# Patient Record
Sex: FEMALE | Race: WHITE | NOT HISPANIC OR LATINO | Employment: PART TIME | URBAN - METROPOLITAN AREA
[De-identification: names, ages, dates, MRNs, and addresses within clinical notes are randomized per-mention and may not be internally consistent; named-entity substitution may affect disease eponyms.]

---

## 2019-08-13 ENCOUNTER — HOSPITAL ENCOUNTER (OUTPATIENT)
Dept: RADIOLOGY | Facility: HOSPITAL | Age: 69
Discharge: HOME | End: 2019-08-13
Payer: MEDICARE

## 2019-08-13 ENCOUNTER — OFFICE VISIT (OUTPATIENT)
Dept: URGENT CARE | Facility: CLINIC | Age: 69
End: 2019-08-13
Payer: MEDICARE

## 2019-08-13 VITALS
SYSTOLIC BLOOD PRESSURE: 131 MMHG | WEIGHT: 122 LBS | DIASTOLIC BLOOD PRESSURE: 60 MMHG | HEIGHT: 68 IN | TEMPERATURE: 98 F | RESPIRATION RATE: 18 BRPM | HEART RATE: 62 BPM | BODY MASS INDEX: 18.49 KG/M2 | OXYGEN SATURATION: 95 %

## 2019-08-13 DIAGNOSIS — J45.20 MILD INTERMITTENT REACTIVE AIRWAY DISEASE WITHOUT COMPLICATION: ICD-10-CM

## 2019-08-13 DIAGNOSIS — R06.2 WHEEZE: ICD-10-CM

## 2019-08-13 DIAGNOSIS — D84.9 IMMUNOCOMPROMISED (CMS/HCC): Primary | ICD-10-CM

## 2019-08-13 DIAGNOSIS — D84.9 IMMUNOCOMPROMISED (CMS/HCC): ICD-10-CM

## 2019-08-13 PROBLEM — M35.00 HISTORY OF SJOGREN'S DISEASE (CMS/HCC): Status: ACTIVE | Noted: 2019-08-13

## 2019-08-13 PROBLEM — M05.79 RHEUMATOID ARTHRITIS INVOLVING MULTIPLE SITES WITH POSITIVE RHEUMATOID FACTOR (CMS/HCC): Status: ACTIVE | Noted: 2019-08-13

## 2019-08-13 LAB
BASOPHILS # BLD AUTO: 0 10*3/UL
BASOPHILS NFR BLD AUTO: 1 % (ref 0–2)
EOSINOPHIL # BLD AUTO: 0.2 10*3/UL
EOSINOPHIL NFR BLD AUTO: 4 % (ref 0–3)
ERYTHROCYTE [DISTWIDTH] IN BLOOD BY AUTOMATED COUNT: 11.8 % (ref 11.5–14)
HCT VFR BLD AUTO: 42.6 % (ref 34–45)
HGB BLD-MCNC: 14.6 G/DL (ref 11.5–15.5)
LYMPHOCYTES # BLD AUTO: 1.6 10*3/UL
LYMPHOCYTES NFR BLD AUTO: 33 % (ref 11–47)
MCH RBC QN AUTO: 34 PG (ref 28–33)
MCHC RBC AUTO-ENTMCNC: 34.2 G/DL (ref 32–36)
MCV RBC AUTO: 99.4 FL (ref 81–97)
MONOCYTES # BLD AUTO: 0.5 10*3/UL
MONOCYTES NFR BLD AUTO: 10 % (ref 3–11)
NEUTROPHILS # BLD AUTO: 2.5 10*3/UL
NEUTROPHILS NFR BLD AUTO: 53 % (ref 41–81)
PLATELET # BLD AUTO: 256 10*3/UL (ref 140–350)
PMV BLD AUTO: 7.4 FL (ref 6.9–10.8)
RBC # BLD AUTO: 4.29 10*6/ΜL (ref 3.7–5.3)
WBC # BLD AUTO: 4.8 10*3/UL (ref 4.5–10.5)

## 2019-08-13 PROCEDURE — G0463 HOSPITAL OUTPT CLINIC VISIT: HCPCS

## 2019-08-13 PROCEDURE — 85025 COMPLETE CBC W/AUTO DIFF WBC: CPT | Performed by: NURSE PRACTITIONER

## 2019-08-13 PROCEDURE — 71046 X-RAY EXAM CHEST 2 VIEWS: CPT

## 2019-08-13 PROCEDURE — 86635 COCCIDIOIDES ANTIBODY: CPT | Performed by: NURSE PRACTITIONER

## 2019-08-13 PROCEDURE — 99203 OFFICE O/P NEW LOW 30 MIN: CPT | Mod: GF | Performed by: NURSE PRACTITIONER

## 2019-08-13 PROCEDURE — 36415 COLL VENOUS BLD VENIPUNCTURE: CPT | Performed by: NURSE PRACTITIONER

## 2019-08-13 RX ORDER — ALBUTEROL SULFATE 90 UG/1
2 INHALANT RESPIRATORY (INHALATION) EVERY 6 HOURS PRN
Qty: 1 INHALER | Refills: 0 | Status: SHIPPED | OUTPATIENT
Start: 2019-08-13 | End: 2020-08-12 | Stop reason: WASHOUT

## 2019-08-13 RX ORDER — FOLIC ACID 1 MG/1
2000 TABLET ORAL
Refills: 11 | COMMUNITY
Start: 2019-05-20 | End: 2021-08-29 | Stop reason: DRUGHIGH

## 2019-08-13 RX ORDER — HYDROXYCHLOROQUINE SULFATE 200 MG/1
TABLET, FILM COATED ORAL DAILY
COMMUNITY
End: 2021-08-29

## 2019-08-13 RX ORDER — MONTELUKAST SODIUM 10 MG/1
10 TABLET ORAL NIGHTLY
Qty: 30 TABLET | Refills: 0 | Status: SHIPPED | OUTPATIENT
Start: 2019-08-13 | End: 2019-09-12 | Stop reason: WASHOUT

## 2019-08-13 NOTE — PROGRESS NOTES
Valorie Hermosillo  1950    Subjective     CC:  Chief Complaint   Patient presents with   • Cough   • lung congestion   • Fatigue   • blood test     wants tested for valley fever        HPI:  Valorie Hermosillo is a 69 y.o. female who presents for request of testing for valley fever.  This patient just spent the last winter in [facility].  She is normally a resident of [facility].  The reason she became more acutely concerned was her dog recently was having hemoptysis and she took him into the vent.  They are doing testing for coccidiomycosis and several other things.  The dog is currently on antifungals and antibiotics and having significant improvement.  Patient tells me that she has a history of rheumatoid arthritis and Sjogren's disease, she is on immune suppressants to treat these diseases.  She also was a smoker for 25 years and quit in 2006.  She has not had any lung cancer screenings.  She is currently having a little bit of a cough but it is intermittent and is dry.  She feels like her lungs are tight she occasionally wheezes.  This seems to be worse at the end of the day.  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  She normally is fairly active and does not feel like she is deconditioned.  She denies any fevers, chills she is eating and drinking well.  She does have chronic constipation but her appetite is fine she has not had any changes there.    Allergies:  Allergies   Allergen Reactions   • Darvocet A500 [Propoxyphene N-Acetaminophen]        Medications:   Current Outpatient Medications on File Prior to Visit   Medication Sig Dispense Refill   • METHOTREXATE ORAL Take by mouth     • SULFASALAZINE ORAL Take by mouth     • hydroxychloroquine (PLAQUENIL) 200 mg tablet Take by mouth daily     • folic acid 1 mg tablet Take 2,000 mcg by mouth  11     No current facility-administered medications on file prior to visit.        Review of  "Systems:  Review of Systems  Pertinent positives and negatives as noted in HPI, 12 point review of systems otherwise negative.    The following have been reviewed and updated as appropriate in this visit  Tobacco  Allergies  Meds  Problems  Med Hx  Surg Hx  Fam Hx         Objective   EXAM:  /60   Pulse 62   Temp 36.7 °C (98 °F)   Resp 18   Ht 1.727 m (5' 8\")   Wt 55.3 kg (122 lb)   SpO2 95%   BMI 18.55 kg/m²     Physical Exam   Constitutional: She is oriented to person, place, and time. She appears well-developed and well-nourished. No distress.   HENT:   Head: Normocephalic and atraumatic.   Right Ear: Tympanic membrane, external ear and ear canal normal.   Left Ear: Tympanic membrane, external ear and ear canal normal.   Nose: Nose normal.   Mouth/Throat: Oropharynx is clear and moist. No oropharyngeal exudate.   Eyes: Pupils are equal, round, and reactive to light. Conjunctivae and EOM are normal. Right eye exhibits no discharge. Left eye exhibits no discharge. No scleral icterus.   Neck: Normal range of motion. Neck supple. No thyromegaly present.   Cardiovascular: Normal rate, regular rhythm and normal heart sounds. Exam reveals no gallop and no friction rub.   No murmur heard.  Pulmonary/Chest: Effort normal. No stridor. No respiratory distress. She has wheezes ( With forced expiration). She has no rales. She exhibits no tenderness.   Lymphadenopathy:     She has no cervical adenopathy.   Neurological: She is alert and oriented to person, place, and time. She exhibits normal muscle tone.   Skin: Skin is warm and dry. Capillary refill takes less than 2 seconds. No rash noted. She is not diaphoretic. No erythema.   Psychiatric: She has a normal mood and affect. Her behavior is normal. Judgment and thought content normal.   Nursing note and vitals reviewed.    Results for orders placed or performed in visit on 08/13/19   CBC w/auto differential Blood, Venous   Result Value Ref Range    WBC 4.8 " 4.5 - 10.5 10*3/uL    RBC 4.29 3.70 - 5.30 10*6/µL    Hemoglobin 14.6 11.5 - 15.5 g/dL    Hematocrit 42.6 34.0 - 45.0 %    MCV 99.4 (H) 81.0 - 97.0 fL    MCH 34.0 (H) 28.0 - 33.0 pg    MCHC 34.2 32.0 - 36.0 g/dL    RDW 11.8 11.5 - 14.0 %    Platelets 256 140 - 350 10*3/uL    MPV 7.4 6.9 - 10.8 fL    Neutrophils% 53 41 - 81 %    Lymphocytes% 33 11 - 47 %    Monocytes% 10 3 - 11 %    Eosinophils% 4 (H) 0 - 3 %    Basophils% 1 0 - 2 %    Neutrophils Absolute 2.50 10*3/uL    Lymphocytes Absolute 1.60 10*3/uL    Monocytes Absolute 0.50 10*3/uL    Eosinophils Absolute 0.20 10*3/uL    Basophils Absolute 0.00 10*3/uL     X-ray Chest 2 Views    Result Date: 8/13/2019  Narrative: Exam: 2 View CXR 08/13/2019 . Clinical history: Immunocompromised (CMS/[facility]) ([facility]); Wheeze; wheeze  concern for exposure to coccidiomycosis Comparison: None Findings: The lungs are clear and there are no pleural effusions. The cardiac and mediastinal contours are normal. Bones appear unremarkable. Lungs appear hyperinflated.     Impression: No acute infiltrate. Hyperinflated lungs could represent some airway trapping/asthma.    A/P:  Diagnoses and all orders for this visit:    Immunocompromised (CMS/[facility]) ([facility])  -     CBC w/auto differential Blood, Venous; Future  -     Coccidioides antibodies Blood, Venous; Future  -     X-ray chest 2 views; Future    Wheeze  -     CBC w/auto differential Blood, Venous; Future  -     Coccidioides antibodies Blood, Venous; Future  -     X-ray chest 2 views; Future    Mild intermittent reactive airway disease without complication  -     albuterol HFA (PROVENTIL HFA;VENTOLIN HFA) 90 mcg/actuation inhaler; Inhale 2 puffs every 6 (six) hours as needed for wheezing  -     montelukast (SINGULAIR) 10 mg tablet; Take 1 tablet (10 mg total) by mouth nightly        Discussed with patient given her concern and her immunocompromise state I think it is reasonable to test her for the coccidial antibodies.  We will go ahead and send  the blood test to help for that but it will take some time for that to come back we will call her once it is available and expressed this to the patient.  Advised patient that given her CBC results, with evidence of slightly elevated eosinophils and evidence of possible asthma with wheezing I think it is reasonable to go ahead and treat her asthma and allergies with an albuterol inhaler and some daily Singulair.  Given that the patient is having some allergy symptoms we will proceed with that.  Discussed use of over-the-counter antihistamines versus singular treatment.  At this time we will trial the Singulair for 1 month.  The patient will follow-up with her primary care provider in [place] in the next month or to see if the Singulair made a difference for her.  Advised patient on if her symptoms worsen or not improving she can present for reevaluation otherwise follow-up at home as needed. Patient expresses understanding and agreement with the plan of care, and had no further questions at the end of the exam today.  She will follow-up as needed.      Patient Instructions:  There are no Patient Instructions on file for this visit.    Follow Up from this visit:  Return if symptoms worsen or fail to improve.    Electronically signed by: Geraldine Marie CNP  8/13/2019  1:01 PM    A voice to text program was used to aid in medical record documentation. Sometimes words are printed not exactly as they were spoken. While efforts were made to carefully edit and correct any inaccuracies, some errors may be present. Errors should be taken within the context of the discussion.  Please contact our office if you need assistance interpreting this medical record or notice any mistakes.

## 2019-08-22 LAB
C IMMITIS AB TITR SER CF: NEGATIVE {TITER}
C IMMITIS IGG SER QL: NEGATIVE
C IMMITIS IGM SER QL: NEGATIVE

## 2019-08-22 NOTE — PROGRESS NOTES
Please call Valorie and let her know that the test for valley fever antibodies came back negative.  Please ask if she is feeling with the interventions we did for asthma/allergies.  Thank you

## 2020-12-17 ENCOUNTER — TELEPHONE (OUTPATIENT)
Dept: FAMILY MEDICINE | Facility: CLINIC | Age: 70
End: 2020-12-17

## 2020-12-17 ENCOUNTER — APPOINTMENT (OUTPATIENT)
Dept: FAMILY MEDICINE | Facility: CLINIC | Age: 70
End: 2020-12-17
Payer: MEDICARE

## 2020-12-17 ENCOUNTER — TELEPHONE - BILLABLE (OUTPATIENT)
Dept: FAMILY MEDICINE | Facility: CLINIC | Age: 70
End: 2020-12-17
Payer: MEDICARE

## 2020-12-17 DIAGNOSIS — U07.1 COVID-19: Primary | ICD-10-CM

## 2020-12-17 DIAGNOSIS — Z11.52 ENCOUNTER FOR SCREENING LABORATORY TESTING FOR COVID-19 VIRUS: ICD-10-CM

## 2020-12-17 LAB — SARS-COV-2 RDRP RESP QL NAA+PROBE: POSITIVE

## 2020-12-17 PROCEDURE — 99442 *INACTIVE DO NOT USE* HC PRO PHYS/QHP TELEPHONE EVALUATION 11-20 MIN: CPT | Performed by: STUDENT IN AN ORGANIZED HEALTH CARE EDUCATION/TRAINING PROGRAM

## 2020-12-17 PROCEDURE — 87635 SARS-COV-2 COVID-19 AMP PRB: CPT | Performed by: STUDENT IN AN ORGANIZED HEALTH CARE EDUCATION/TRAINING PROGRAM

## 2020-12-17 PROCEDURE — G0463 HOSPITAL OUTPT CLINIC VISIT: HCPCS | Performed by: STUDENT IN AN ORGANIZED HEALTH CARE EDUCATION/TRAINING PROGRAM

## 2020-12-17 PROCEDURE — C9803 HOPD COVID-19 SPEC COLLECT: HCPCS | Mod: CS,NCP | Performed by: STUDENT IN AN ORGANIZED HEALTH CARE EDUCATION/TRAINING PROGRAM

## 2020-12-17 ASSESSMENT — ENCOUNTER SYMPTOMS
DIARRHEA: 0
SHORTNESS OF BREATH: 0
CHILLS: 0
APPETITE CHANGE: 0
FEVER: 0
NAUSEA: 0
CONSTIPATION: 0
RHINORRHEA: 0
VOMITING: 0
FATIGUE: 1
DYSURIA: 0

## 2020-12-17 NOTE — TELEPHONE ENCOUNTER
In review of your medical history, you may be a candidate for administration of a monoclonal antibody that the FDA has issued and granted emergency use authorization to permit the use of an unapproved product to help to treat COVID-19 by decreasing symptoms and risk of hospitalization. Would you be interested in having a telephone visit with a physician to discuss? Yes. Is the patient scheduled?  Yes with Smiley Rubin DO      Criteria for COVID-19 Antibody Infusion Eligibility This Emergency Use Authorization (EUA) is for the use of the unapproved product Bamlanivimab or Casirivimab and Imdevimab for the treatment of mild to moderate COVID-19 in adults and pediatric patients with positive results of direct SARS-CoV-2 viral testing who are 12 years of age and older, weighing at least 40kg, and who are at high risk for progressing to severe COVID-19 symptoms and/or hospitalization.     High Risk is defined as patients who meet at least one of the following criteria:   [] Have a body mass index (BMI) greater than or equal to 35   [] Have chronic kidney disease   [] Have diabetes   [x] Have immunosuppressive disease   [] Are currently receiving immunosuppressive treatment   [x] Are 65 years of age or older   [] Are 55 years of age or older AND have   [] cardiovascular disease, OR  [] hypertension, OR -   [] chronic obstructive pulmonary disease/other chronic respiratory disease   [] Are 12-17 years of age AND have   [] BMI greater than or equal to the 85th percentile for their age and gender based on CDC growth charts  [] sickle cell disease, OR  [] congential or acquired heart disease, OR   [] neurodevelopmental disorders, for example, cerebral palsy, OR   [] a medical-related technological dependence, for example, tracheostomy, gastrostomy, or positive pressure ventilation (not related to COVID-19), OR   [] asthma, reactive airway or other chronic respiratory disease that requires daily medication for control    A  provider must meet with the patient to educate, determine eligibility, and then order therapy, if appropriate. A telephone billable visit will be scheduled with an appropriate provider within each of [facility]'s markets to review and complete the aforementioned items. The out-of-pocket cost for a telephone visit is $183. Furthermore, the unique code for infusion of the antibody medication, Bamlanivimab, has an associated cost of $929 for the nursing administration and $82.12 for the normal saline solution for a total of $1011.12.     Payers (Insurance) generally cover these costs; however, patient will be required to sign a waiver indicating any remaining balance associated with the infusion will be the patient's responsibility due to the vast variety of insurance companies, coverage plans, and deductibles. Additionally, your insurance may require prior authorization. The authorization staff will review each patient’s case independently.    Patient/Legal Guardian verbalizes understanding, denies further questions or concerns, is agreeable to the plan of care, and requests to proceed with scheduling of initial provider visit.

## 2020-12-17 NOTE — PROGRESS NOTES
Subjective   Per discussion with Smiley Kirkpatrick, Valorie, has verbally consented to be treated via a telephone based visit: Yes. A total of 15 minutes were required for this telephone based visit.   Patient Location: Home  Provider Location: Clinic  Technology used by Provider: Phone    HPI  Valorie Hermosillo is a 70 y.o. female who presents to discuss BAM after having been diagnosed with COVID today.    She started having symptoms of COVID on 12/16/2020 was diagnosed today. She is on immunosuppressants for rheumatoid arthritis.  She is taking both Plaquenil and sulfasalazine.  Her doctor is in [place].  She report that she has been feeling fatigued but otherwise not too badly.    The following have been reviewed and updated as appropriate in this visit:    Allergies   Allergen Reactions   • Darvocet A500 [Propoxyphene N-Acetaminophen]      Current Outpatient Medications   Medication Sig Dispense Refill   • BIOTIN ORAL Take by mouth     • ascorbic acid/collagen hydr (COLLAGEN PLUS VITAMIN C ORAL) Take by mouth     • SULFASALAZINE ORAL Take by mouth     • hydroxychloroquine (PLAQUENIL) 200 mg tablet Take by mouth daily     • folic acid 1 mg tablet Take 2,000 mcg by mouth  11   • METHOTREXATE ORAL Take by mouth       No current facility-administered medications for this visit.      Past Medical History:   Diagnosis Date   • RA (rheumatoid arthritis) (CMS/HCC) (Formerly [facility])      Past Surgical History:   Procedure Laterality Date   • HYSTERECTOMY       Family History   Problem Relation Age of Onset   • Cancer Father    • Cancer Sister      Social History     Occupational History   • Not on file   Tobacco Use   • Smoking status: Former Smoker   • Smokeless tobacco: Never Used   Substance and Sexual Activity   • Alcohol use: Yes     Comment: wine 1-2 glasses 3-4 nights a week   • Drug use: Never   • Sexual activity: Defer   Social History Narrative     lives here in [place] winter       Review of Systems    Constitutional: Positive for fatigue. Negative for appetite change, chills and fever.   HENT: Negative for rhinorrhea.    Respiratory: Negative for shortness of breath.    Cardiovascular: Negative for chest pain and leg swelling.   Gastrointestinal: Negative for constipation, diarrhea, nausea and vomiting.   Genitourinary: Negative for dysuria.       Objective   Physical Exam  Vitals reviewed: not performed due to telephone visit.         Assessment/Plan   Diagnoses and all orders for this visit:    COVID-19  -     Bamlanivimab Ambulatory Referral to Infusion Therapy       Valorie Hermosillo has COVID with plans to initiate bamlanivimab via EUA.     The patient is NOT primarily admitted to the hospital, requiring oxygen or requiring oxygen greater than baseline requirement due to an active COVID-19 infection.    The patient has mild-moderate COVID-19 with a positive COVID-CoV-2 test and is within 10 days of symptom onset with high risk for progression to severe COVID-19 and/or hospitalization. Date of symptom onset 3/16/2020.    The patient has the following high-risk criteria meeting requirements for the EUA: Age greater than or equal to 65.    The patient and or patient's parent(s)/caregiver(s) has been given the 'Fact Sheet for Patient and Parents/Caregivers', informed of the alternatives to receiving bamlanivimab and informed that bamlanivimab is an unapproved drug that is authorized for use under EUA (Emergency Use Authorization).     Bamlanivimab fact sheet for healthcare providers  Bamlanivimab  fact sheet for patients and parents and/or caregivers (english)  Bamlanivimab  fact sheet for patients and parents and/or caregivers (M[place]ovan)    The patient does not have any known past adverse reaction(s) or hypersensitivity to bamlanivimab.    The patient  has agreed to treatment with bamlanivimab.    The patient will be monitored closely for adverse drug events. If concern for an adverse event occurs, pharmacy will  be contacted and the event will be reported to the FDA.    She is on immunosuppressants for her rheumatoid arthritis, I asked that she call her rheumatologist in [place] to advise whether or not she should continue these medications while she has Covid as the rheumatologist here has been recommending discontinuing them until they have recovered.    Smiley Kirkpatrick, DO

## 2020-12-17 NOTE — PATIENT INSTRUCTIONS
Bamlanivimab  fact sheet for patients and parents and/or caregivers (english)    Call your rheumatologist for their recommendations about the Plaquenil and the sulfasalazine.     Fact Sheet for Patients, Parents and Caregivers   Emergency Use Authorization (EUA) of Bamlanivimab for Coronavirus Disease 2019 (COVID-19)   You are being given a medicine called bamlanivimab for the treatment of coronavirus disease 2019 (COVID- 19). This Fact Sheet contains information to help you understand the potential risks and potential benefits of taking bamlanivimab, which you may receive.   Receiving bamlanivimab may benefit certain people with COVID-19.   Read this Fact Sheet for information about bamlanivimab. Talk to your healthcare provider if you have questions. It is your choice to receive bamlanivimab or stop it at any time.   What is COVID-19?   COVID-19 is caused by a virus called a coronavirus. People can get COVID-19 through contact with another person who has the virus.   COVID-19 illnesses have ranged from very mild (including some with no reported symptoms) to severe, including illness resulting in death. While information so far suggests that most COVID-19 illness is mild, serious illness can happen and may cause some of your other medical conditions to become worse. People of all ages with severe, long-lasting (chronic) medical conditions like heart disease, lung disease, and diabetes, for example, seem to be at higher risk of being hospitalized for COVID-19.   What are the symptoms of COVID-19?   The symptoms of COVID-19 include fever, cough, and shortness of breath, which may appear 2 to 14 days after exposure. Serious illness including breathing problems can occur and may cause your other medical conditions to become worse.   What is bamlanivimab?   Bamlanivimab is an investigational medicine used for the treatment of COVID-19 in non-hospitalized adults and adolescents 12 years of age and older with mild to  moderate symptoms who weigh 88 pounds (40 kg) or more, and who are at high risk for developing severe COVID-19 symptoms or the need for hospitalization. Bamlanivimab is investigational because it is still being studied. There is limited information known about the safety or effectiveness of using bamlanivimab to treat people with COVID-19.   The FDA has authorized the emergency use of bamlanivimab for the treatment of COVID-19 under an Emergency Use Authorization (EUA). For more information on EUA, see the section “What is an Emergency Use Authorization (EUA)?” at the end of this Fact Sheet.   What should I tell my healthcare provider before I receive bamlanivimab?   Tell your healthcare provider about all of your medical conditions, including if you:   • Have any allergies   • Are pregnant or plan to become pregnant   • Are breastfeeding or plan to breastfeed   • Have any serious illnesses   • Are taking any medications (prescription, over-the-counter, vitamins, and herbal products)     How will I receive bamlanivimab?   • Bamlanivimab is given to you through a vein (intravenous or IV) for at least 1 hour.   • You will receive one dose of bamlanivimab by IV infusion.     What are the important possible side effects of bamlanivimab?   Possible side effects of bamlanivimab are:   • Allergic reactions. Allergic reactions can happen during and after infusion with bamlanivimab. Tell your healthcare provider right away if you get any of the following signs and symptoms of allergic reactions: fever,     2   chills, nausea, headache, shortness of breath, low blood pressure, wheezing, swelling of your lips, face, or throat, rash including hives, itching, muscle aches, and dizziness.     The side effects of getting any medicine by vein may include brief pain, bleeding, bruising of the skin, soreness, swelling, and possible infection at the infusion site.   These are not all the possible side effects of bamlanivimab. Not a  lot of people have been given bamlanivimab. Serious and unexpected side effects may happen. Bamlanivimab is still being studied so it is possible that all of the risks are not known at this time.   It is possible that bamlanivimab could interfere with your body's own ability to fight off a future infection of SARS-CoV-2. Similarly, bamlanivimab may reduce your body’s immune response to a vaccine for SARS-CoV-2. Specific studies have not been conducted to address these possible risks. Talk to your healthcare provider if you have any questions.   What other treatment choices are there?   Like bamlanivimab, FDA may allow for the emergency use of other medicines to treat people with COVID-19. Go to https://www.harna89rncyaahslwuzgysneso.nih.gov/ for information on the emergency use of other medicines that are not approved by FDA to treat people with COVID-19. Your healthcare provider may talk with you about clinical trials you may be eligible for.   It is your choice to be treated or not to be treated with bamlanivimab. Should you decide not to receive bamlanivimab or stop it at any time, it will not change your standard medical care.   What if I am pregnant or breastfeeding?   There is limited experience treating pregnant women or breastfeeding mothers with bamlanivimab. For a mother and unborn baby, the benefit of receiving bamlanivimab may be greater than the risk from the treatment. If you are pregnant or breastfeeding, discuss your options and specific situation with your healthcare provider.   How do I report side effects with bamlanivimab?   Tell your healthcare provider right away if you have any side effect that bothers you or does not go away.   Report side effects to FDA MedWatch at www.fda.gov/medwatch, call 1-014-FDA-5981, or contact [employer] at 4-760-FgnsdK08 ([phone]).   How can I learn more?   • Ask your healthcare provider   • Visit [email or web address]   • Visit  https://www.jpnim78lguinapxumzngipazvc.nih.gov/   • Contact your local or state public health department     What is an Emergency Use Authorization (EUA)?   The [place] FDA has made bamlanivimab available under an emergency access mechanism called an EUA. The EUA is supported by a [place] of Health and Human Service (HHS) declaration that circumstances exist to justify the emergency use of drugs and biological products during the COVID-19 pandemic.   Bamlanivimab has not undergone the same type of review as an FDA-approved or cleared product. The FDA may issue an EUA when certain criteria are met, which includes that there are no adequate, approved, and available alternatives. In addition, the FDA decision is based on the totality of scientific evidence available showing that it is reasonable to believe that the product meets certain criteria for safety, performance, and labeling and may be effective in treatment of patients during the COVID-19 pandemic. All of these criteria must be met to allow for the product to be used in the treatment of patients during the COVID-19 pandemic.

## 2020-12-18 ENCOUNTER — HOSPITAL ENCOUNTER (OUTPATIENT)
Facility: HOSPITAL | Age: 70
Discharge: HOME | End: 2020-12-18
Payer: MEDICARE

## 2020-12-18 VITALS
DIASTOLIC BLOOD PRESSURE: 60 MMHG | HEART RATE: 63 BPM | OXYGEN SATURATION: 95 % | TEMPERATURE: 98.2 F | SYSTOLIC BLOOD PRESSURE: 104 MMHG

## 2020-12-18 DIAGNOSIS — U07.1 COVID-19: Primary | ICD-10-CM

## 2020-12-18 PROCEDURE — 2560000200 HC RX 256 W OR WO HCPCS: Mod: FB | Performed by: STUDENT IN AN ORGANIZED HEALTH CARE EDUCATION/TRAINING PROGRAM

## 2020-12-18 PROCEDURE — M0239 BAMLANIVIMAB-XXXX INFUSION: HCPCS | Performed by: STUDENT IN AN ORGANIZED HEALTH CARE EDUCATION/TRAINING PROGRAM

## 2020-12-18 PROCEDURE — 2580000300 HC RX 258: Performed by: STUDENT IN AN ORGANIZED HEALTH CARE EDUCATION/TRAINING PROGRAM

## 2020-12-18 RX ADMIN — SODIUM CHLORIDE 700 MG: 9 INJECTION, SOLUTION INTRAVENOUS at 15:15

## 2021-01-14 ENCOUNTER — TELEPHONE (OUTPATIENT)
Dept: FAMILY MEDICINE | Facility: CLINIC | Age: 71
End: 2021-01-14

## 2021-01-14 NOTE — TELEPHONE ENCOUNTER
called and visited with pt. She has had this all over body rash for 3 weeks and it appeared 1 week after BAM infusion is in [place] per  would like her to get seen so she is going to go be seen in [place]

## 2021-01-14 NOTE — TELEPHONE ENCOUNTER
Patient called and had COVID and the antibody infusion.    Patient now has a rash and is wondering if the can be a reaction to the BAM infusion.    Please advise.

## 2021-03-18 DIAGNOSIS — Z23 HIGH PRIORITY FOR 2019-NCOV VACCINE: ICD-10-CM

## 2021-05-04 ENCOUNTER — CONSULT (OUTPATIENT)
Dept: PULMONOLOGY | Facility: CLINIC | Age: 71
End: 2021-05-04
Payer: MEDICARE

## 2021-05-04 VITALS — SYSTOLIC BLOOD PRESSURE: 102 MMHG | DIASTOLIC BLOOD PRESSURE: 64 MMHG

## 2021-05-04 DIAGNOSIS — K21.9 GASTROESOPHAGEAL REFLUX DISEASE, UNSPECIFIED WHETHER ESOPHAGITIS PRESENT: ICD-10-CM

## 2021-05-04 DIAGNOSIS — R05.3 CHRONIC COUGH: ICD-10-CM

## 2021-05-04 DIAGNOSIS — R91.8 PULMONARY INFILTRATE: Primary | ICD-10-CM

## 2021-05-04 DIAGNOSIS — R91.8 OPACITY OF LUNG ON IMAGING STUDY: ICD-10-CM

## 2021-05-04 PROCEDURE — G0463 HOSPITAL OUTPT CLINIC VISIT: HCPCS | Mod: PO | Performed by: INTERNAL MEDICINE

## 2021-05-04 PROCEDURE — 99204 OFFICE O/P NEW MOD 45 MIN: CPT | Performed by: INTERNAL MEDICINE

## 2021-05-04 RX ORDER — FLUTICASONE PROPIONATE 100 UG/1
1 POWDER, METERED RESPIRATORY (INHALATION) AS NEEDED
COMMUNITY
End: 2021-08-29 | Stop reason: SDUPTHER

## 2021-05-05 ENCOUNTER — TELEPHONE (OUTPATIENT)
Dept: PULMONOLOGY | Facility: CLINIC | Age: 71
End: 2021-05-05

## 2021-05-05 DIAGNOSIS — Z01.818 PRE-OPERATIVE CLEARANCE: Primary | ICD-10-CM

## 2021-05-05 NOTE — TELEPHONE ENCOUNTER
Contacted patient and went over the following information:    Procedure: Endobronchial Ultrasound/Bronchoscopy  Place: [facility]- [address] [place] [address]  Doctor to perform procedure: Dr. Andres Rios  Date: Thursday, May 20th at 7:00am  Time: Report to Main Entrance ([address] entrance) of the Hospital at  6:00am  . *Please bring your insurance card, ID card, and wear a mask.    Instructions:  *Do not eat or drink anything after midnight before your procedure, with exception of morning meds which are okay to take with sips of water only.  *Do not take any anticoagulants (such as Coumadin, Xarelto, Plavix, etc.), aspirin, or NSAIDs (Advil, Aleve, Motrin, ibuprofen, naproxen, or Naprosyn) 5 days prior to the procedure, unless otherwise discussed with your Pulmonologist.  *Don’t put on any lotion, cream, deodorant, makeup, powder, perfume, or cologne.  *Remove dentures, partials, and any jewelry, including body piercings.   *Leave valuable items at home (such as credit cards, jewelry, and your checkbook).  *You must have someone to bring you to the procedure and they must be at the hospital to drive you home after the procedure, as you are not to drive for the rest of the day.  *Please plan to be at the hospital for 2-4 hours, barring any complications. This procedure is done on an outpatient basis.   **A COVID19 test for pre-operative clearance must be done 4-7 days prior to procedure unless approved by the surgery board. This test is not needed if patient has been vaccinated for COVID-19 and it has been 7 days after patient has received their 2nd vaccination dose of Moderna or Pfizer or 7 days after receiving the Chris and Chris vaccine. (Order placed and patient states she will get this done at [facility]).    *Please contact our office at [phone] or LISSETH Haq (Pulmonary Navigator) at [phone] if you have any questions or concerns  prior to your upcoming procedure or if you need to reschedule your procedure.      Patient verbalized understanding of the above information and instructions.

## 2021-05-17 ENCOUNTER — CLINICAL SUPPORT (OUTPATIENT)
Dept: FAMILY MEDICINE | Facility: CLINIC | Age: 71
End: 2021-05-17
Payer: MEDICARE

## 2021-05-17 DIAGNOSIS — Z01.818 PRE-OPERATIVE CLEARANCE: ICD-10-CM

## 2021-05-17 PROCEDURE — U0005 INFEC AGEN DETEC AMPLI PROBE: HCPCS | Performed by: FAMILY MEDICINE

## 2021-05-17 PROCEDURE — C9803 HOPD COVID-19 SPEC COLLECT: HCPCS | Mod: CS | Performed by: FAMILY MEDICINE

## 2021-05-17 PROCEDURE — G0463 HOSPITAL OUTPT CLINIC VISIT: HCPCS | Performed by: NURSE PRACTITIONER

## 2021-05-17 NOTE — PROGRESS NOTES
Pt.presents for a pre op covid test obtained sample and sent to lab. Pt. Tolerated well A Juany MONTANEZ

## 2021-05-18 ENCOUNTER — ANESTHESIA EVENT (OUTPATIENT)
Dept: GASTROENTEROLOGY | Facility: HOSPITAL | Age: 71
End: 2021-05-18
Payer: MEDICARE

## 2021-05-18 LAB — SARS-COV-2 RNA RESP QL NAA+PROBE: NEGATIVE

## 2021-05-19 NOTE — H&P
Assessment        1. Pulmonary infiltrate    2. Opacity of lung on imaging study    3. Chronic cough    4. Gastroesophageal reflux disease, unspecified whether esophagitis present                Plan   1.  Right middle lobe infiltrate concerning for atypical infection.  Also question whether this could be from chronic silent aspiration.  Plan for bronchoscopy 5/18 for lavage of right middle lobe and EBUS sampling of pretracheal lymph node.  Discussed at length indications, limitations, risks and benefits of the procedure and all questions were answered.  She was agreeable to have this done to evaluate for atypical infection.  She does understand the possibility of a nondiagnostic procedure.     2.  Chronic cough and wheezing.  Agree with ongoing treatment for reflux.  We will tentatively schedule swallow study and pulmonary function testing to better assess.  This may be changed depending on culture results of bronchoscopy.     3.  No nodules on CT concerning for malignancy.  With smoking history warrants annual low-dose CT until she is quit for 15 years.  Briefly discussed current guidelines on lung cancer screening     4.  We will arrange follow-up 6 weeks after bronchoscopy to reassess.  Will need swallow study and PFT done prior to that appointment.           On this date of service 55 minutes of total time was spent on this encounter (from 3619-2455, [id number].  This included preappointment review of chart, counseling, documentation, discussion of testing options, and shared decision-making.        ARIANA Hermosillo is a 71 y.o. year old female who was referred for pulmonary infiltrate.  She describes a long history of chronic cough and feeling upper airway congestion and wheezing.  Symptoms happen most days without clear pattern.  Her cough and wheezing do not seem to worsen when lying supine, although will frequently awaken her at night.  The symptoms frequently occur in the morning but not consistently  worse than at other times.  She denies any dysphagia or aspiration complaints.  No significant mutations in her typical activities associated with breathing.  She does have significant reflux disease that is much improved on PPI.  This may have helped her cough some, but still persists.     During evaluation had a CT chest done showing right middle lobe infiltrate with tree-in-bud pattern.     Has rheumatoid arthritis with chronic joint pains and swelling previously treated with methotrexate.  She had to discontinue this because of stomach symptoms and is using sulfasalazine and Plaquenil currently.  Is planned to start Cimzia but needed evaluation of pulmonary filtrate to ensure that she does not have an active infection.  Also with history of Sjogren's disease with dry mucous membranes.     Prior 25-pack-year history of tobacco, quit 10-12 years ago.  No unusual exposures.     Contracted Covid December 2020 with mild illness.  She not require hospitalization or oxygenation.  Was treated with bamlanivimab                       Objective      /64         Physical Exam  Vitals reviewed.   Constitutional:       General: She is not in acute distress.     Appearance: Normal appearance. She is normal weight.   HENT:      Nose:      Comments: No sinus tenderness     Mouth/Throat:      Mouth: Mucous membranes are moist.      Pharynx: Oropharynx is clear. No posterior oropharyngeal erythema.   Cardiovascular:      Rate and Rhythm: Normal rate and regular rhythm.   Pulmonary:      Effort: Pulmonary effort is normal.      Breath sounds: No stridor. No wheezing or rhonchi.   Abdominal:      Palpations: Abdomen is soft.      Tenderness: There is no abdominal tenderness.   Musculoskeletal:      Cervical back: No tenderness.      Right lower leg: No edema.      Left lower leg: No edema.   Lymphadenopathy:      Cervical: No cervical adenopathy.   Skin:     General: Skin is warm and dry.      Capillary Refill: Capillary  refill takes less than 2 seconds.   Neurological:      Mental Status: She is alert and oriented to person, place, and time.   Psychiatric:         Behavior: Behavior normal.                     Imaging           PFT  None available           Lab             No lab exists for component: LABALBU                                    The following have been reviewed and updated as appropriate in this visit:        Review of Systems  Per HPI.  Otherwise complete 10 system review unremarkable.        Medical History        Past Medical History:   Diagnosis Date   • RA (rheumatoid arthritis) (CMS/HCC) (HCC)                 Surgical History         Past Surgical History:   Procedure Laterality Date   • HYSTERECTOMY                   Social History            Tobacco Use   • Smoking status: Former Smoker       Years: 25.00       Quit date: 2009       Years since quittin.0   • Smokeless tobacco: Never Used   Substance Use Topics   • Alcohol use: Yes       Comment: wine 1-2 glasses 3-4 nights a week   • Drug use: Never                  Family History   Problem Relation Age of Onset   • Cancer Father     • Cancer Sister                 Current Outpatient Medications:   •  fluticasone propionate (Flovent Diskus) 100 mcg/actuation diskus inhaler, Inhale 1 puff as needed Rinse mouth with water after use. Do not swallow., Disp: , Rfl:   •  BIOTIN ORAL, Take by mouth, Disp: , Rfl:   •  ascorbic acid/collagen hydr (COLLAGEN PLUS VITAMIN C ORAL), Take by mouth, Disp: , Rfl:   •  SULFASALAZINE ORAL, Take by mouth, Disp: , Rfl:   •  hydroxychloroquine (PLAQUENIL) 200 mg tablet, Take by mouth daily, Disp: , Rfl:   •  folic acid 1 mg tablet, Take 2,000 mcg by mouth, Disp: , Rfl: 11  •  METHOTREXATE ORAL, Take by mouth, Disp: , Rfl:

## 2021-05-20 ENCOUNTER — HOSPITAL ENCOUNTER (OUTPATIENT)
Facility: HOSPITAL | Age: 71
Setting detail: OUTPATIENT SURGERY
Discharge: HOME | End: 2021-05-20
Attending: INTERNAL MEDICINE | Admitting: INTERNAL MEDICINE
Payer: MEDICARE

## 2021-05-20 ENCOUNTER — ANESTHESIA (OUTPATIENT)
Dept: GASTROENTEROLOGY | Facility: HOSPITAL | Age: 71
End: 2021-05-20
Payer: MEDICARE

## 2021-05-20 VITALS
WEIGHT: 120.6 LBS | DIASTOLIC BLOOD PRESSURE: 53 MMHG | HEIGHT: 68 IN | RESPIRATION RATE: 16 BRPM | SYSTOLIC BLOOD PRESSURE: 101 MMHG | BODY MASS INDEX: 18.28 KG/M2 | TEMPERATURE: 97.7 F | OXYGEN SATURATION: 96 % | HEART RATE: 70 BPM

## 2021-05-20 DIAGNOSIS — R91.8 PULMONARY INFILTRATE: ICD-10-CM

## 2021-05-20 LAB
APPEARANCE FLD: ABNORMAL
B PARAP IS1001 DNA NPH QL NAA+NON-PROBE: NEGATIVE
B PERT.PT PRMT NPH QL NAA+NON-PROBE: NEGATIVE
BRONCHIAL EPITHELIAL CELLS PRESENT: YES
C PNEUM DNA NPH QL NAA+NON-PROBE: NEGATIVE
COLOR FLD: COLORLESS
EPITHELIAL CELLS ON REPIRATORY GRAM STAIN /LPF: NORMAL /LPF
FLUAV RNA NPH QL NAA+NON-PROBE: NEGATIVE
FLUBV RNA NPH QL NAA+NON-PROBE: NEGATIVE
FLUID SOURCE, HEMATOLOGY: ABNORMAL
FLUID TYPE, HEMATOLOGY: ABNORMAL
HADV DNA NPH QL NAA+NON-PROBE: NEGATIVE
HCOV 229E RNA NPH QL NAA+NON-PROBE: NEGATIVE
HCOV HKU1 RNA NPH QL NAA+NON-PROBE: NEGATIVE
HCOV NL63 RNA NPH QL NAA+NON-PROBE: NEGATIVE
HCOV OC43 RNA NPH QL NAA+NON-PROBE: NEGATIVE
HMPV RNA NPH QL NAA+NON-PROBE: NEGATIVE
HPIV1 RNA NPH QL NAA+NON-PROBE: NEGATIVE
HPIV2 RNA NPH QL NAA+NON-PROBE: NEGATIVE
HPIV3 RNA NPH QL NAA+NON-PROBE: NEGATIVE
HPIV4 RNA NPH QL NAA+NON-PROBE: NEGATIVE
LYMPHOCYTES # BLD MANUAL: 3 %
M PNEUMO DNA NPH QL NAA+NON-PROBE: NEGATIVE
MACROPHAGES NFR FLD MANUAL: 10 %
MAYO CULTURE AND STAIN: NORMAL
MUCUS PRESENCE IN REPIRATORY GRAM STAIN: NORMAL
NEUTROPHILS NFR FLD MANUAL: 87 %
ORGANISM PREDOMINANCE IN REPIRATORY GRAM STAIN: NORMAL
PMNS ON RESPIRATORY GRAM STAIN /LPF: NORMAL /LPF
RBC # FLD MANUAL: 276 CELLS/MM*3
REPIRATORY GRAM STAIN INTERP: NORMAL
RSV RNA NPH QL NAA+NON-PROBE: NEGATIVE
RV+EV RNA NPH QL NAA+NON-PROBE: NEGATIVE
SARS-COV-2 RNA NPH QL NAA+NON-PROBE: NEGATIVE
TOTAL CELLS COUNTED FLD: 100 CELLS
WBC # FLD MANUAL: 1026 CELLS/MM*3

## 2021-05-20 PROCEDURE — 89051 BODY FLUID CELL COUNT: CPT | Performed by: INTERNAL MEDICINE

## 2021-05-20 PROCEDURE — 87116 MYCOBACTERIA CULTURE: CPT | Performed by: INTERNAL MEDICINE

## 2021-05-20 PROCEDURE — 6360000200 HC RX 636 W HCPCS (ALT 250 FOR IP): Performed by: ANESTHESIOLOGY

## 2021-05-20 PROCEDURE — (BLANK) HC GENERAL ANESTHESIA FACILITY CHARGE EACH ADDITIONAL MIN: Performed by: INTERNAL MEDICINE

## 2021-05-20 PROCEDURE — 87205 SMEAR GRAM STAIN: CPT | Performed by: INTERNAL MEDICINE

## 2021-05-20 PROCEDURE — 6360000200 HC RX 636 W HCPCS (ALT 250 FOR IP): Mod: JW | Performed by: NURSE ANESTHETIST, CERTIFIED REGISTERED

## 2021-05-20 PROCEDURE — 87581 M.PNEUMON DNA AMP PROBE: CPT | Performed by: INTERNAL MEDICINE

## 2021-05-20 PROCEDURE — 87206 SMEAR FLUORESCENT/ACID STAI: CPT | Performed by: INTERNAL MEDICINE

## 2021-05-20 PROCEDURE — (BLANK) HC RECOVERY PHASE-1 1ST  HOUR ACUITY LEVEL 2: Performed by: INTERNAL MEDICINE

## 2021-05-20 PROCEDURE — 2500000200 HC RX 250 WO HCPCS: Performed by: NURSE ANESTHETIST, CERTIFIED REGISTERED

## 2021-05-20 PROCEDURE — 31624 DX BRONCHOSCOPE/LAVAGE: CPT | Performed by: INTERNAL MEDICINE

## 2021-05-20 PROCEDURE — (BLANK) HC GENERAL ANESTHESIA FACILITY CHARGE 1ST 15 MIN: Performed by: INTERNAL MEDICINE

## 2021-05-20 PROCEDURE — 87798 DETECT AGENT NOS DNA AMP: CPT | Performed by: INTERNAL MEDICINE

## 2021-05-20 PROCEDURE — 87075 CULTR BACTERIA EXCEPT BLOOD: CPT | Performed by: INTERNAL MEDICINE

## 2021-05-20 PROCEDURE — 31623 DX BRONCHOSCOPE/BRUSH: CPT | Mod: 51 | Performed by: INTERNAL MEDICINE

## 2021-05-20 PROCEDURE — (BLANK): Performed by: INTERNAL MEDICINE

## 2021-05-20 PROCEDURE — 87070 CULTURE OTHR SPECIMN AEROBIC: CPT | Performed by: INTERNAL MEDICINE

## 2021-05-20 PROCEDURE — 00520 ANES CLOSED CHEST PX NOS: CPT | Performed by: NURSE ANESTHETIST, CERTIFIED REGISTERED

## 2021-05-20 PROCEDURE — 2500000200 HC RX 250 WO HCPCS: Performed by: INTERNAL MEDICINE

## 2021-05-20 PROCEDURE — 88112 CYTOPATH CELL ENHANCE TECH: CPT

## 2021-05-20 PROCEDURE — (BLANK) HC RECOVERY PHASE-2 1ST 1/2 HOUR ACUITY LEVEL 1: Performed by: INTERNAL MEDICINE

## 2021-05-20 PROCEDURE — 2580000300 HC RX 258: Performed by: ANESTHESIOLOGY

## 2021-05-20 PROCEDURE — 6370000100 HC RX 637 (ALT 250 FOR IP): Performed by: ANESTHESIOLOGY

## 2021-05-20 PROCEDURE — 87496 CYTOMEG DNA AMP PROBE: CPT | Performed by: INTERNAL MEDICINE

## 2021-05-20 PROCEDURE — 87102 FUNGUS ISOLATION CULTURE: CPT | Performed by: INTERNAL MEDICINE

## 2021-05-20 RX ORDER — IPRATROPIUM BROMIDE AND ALBUTEROL SULFATE 2.5; .5 MG/3ML; MG/3ML
3 SOLUTION RESPIRATORY (INHALATION) ONCE
Status: COMPLETED | OUTPATIENT
Start: 2021-05-20 | End: 2021-05-20

## 2021-05-20 RX ORDER — FENTANYL CITRATE/PF 50 MCG/ML
PLASTIC BAG, INJECTION (ML) INTRAVENOUS AS NEEDED
Status: DISCONTINUED | OUTPATIENT
Start: 2021-05-20 | End: 2021-05-20 | Stop reason: SURG

## 2021-05-20 RX ORDER — SODIUM CHLORIDE, SODIUM LACTATE, POTASSIUM CHLORIDE, CALCIUM CHLORIDE 600; 310; 30; 20 MG/100ML; MG/100ML; MG/100ML; MG/100ML
100 INJECTION, SOLUTION INTRAVENOUS CONTINUOUS
Status: DISCONTINUED | OUTPATIENT
Start: 2021-05-20 | End: 2021-05-20 | Stop reason: HOSPADM

## 2021-05-20 RX ORDER — PROPOFOL 10 MG/ML
INJECTION, EMULSION INTRAVENOUS AS NEEDED
Status: DISCONTINUED | OUTPATIENT
Start: 2021-05-20 | End: 2021-05-20 | Stop reason: SURG

## 2021-05-20 RX ORDER — ONDANSETRON HYDROCHLORIDE 2 MG/ML
4 INJECTION, SOLUTION INTRAVENOUS ONCE
Status: COMPLETED | OUTPATIENT
Start: 2021-05-20 | End: 2021-05-20

## 2021-05-20 RX ORDER — FAMOTIDINE 10 MG/ML
20 INJECTION INTRAVENOUS ONCE
Status: COMPLETED | OUTPATIENT
Start: 2021-05-20 | End: 2021-05-20

## 2021-05-20 RX ORDER — SODIUM CHLORIDE 0.9 % (FLUSH) 0.9 %
2 SYRINGE (ML) INJECTION EVERY 8 HOURS SCHEDULED
Status: DISCONTINUED | OUTPATIENT
Start: 2021-05-20 | End: 2021-05-20 | Stop reason: HOSPADM

## 2021-05-20 RX ORDER — SODIUM CHLORIDE 0.9 G/100ML
INJECTION, SOLUTION IRRIGATION AS NEEDED
Status: DISCONTINUED | OUTPATIENT
Start: 2021-05-20 | End: 2021-05-20 | Stop reason: HOSPADM

## 2021-05-20 RX ORDER — LIDOCAINE HYDROCHLORIDE 20 MG/ML
INJECTION, SOLUTION INFILTRATION; PERINEURAL
Status: DISCONTINUED
Start: 2021-05-20 | End: 2021-05-20 | Stop reason: HOSPADM

## 2021-05-20 RX ORDER — DIPHENHYDRAMINE HYDROCHLORIDE 50 MG/ML
25 INJECTION INTRAMUSCULAR; INTRAVENOUS ONCE AS NEEDED
Status: DISCONTINUED | OUTPATIENT
Start: 2021-05-20 | End: 2021-05-20 | Stop reason: HOSPADM

## 2021-05-20 RX ORDER — MIDAZOLAM HYDROCHLORIDE 1 MG/ML
1 INJECTION INTRAMUSCULAR; INTRAVENOUS EVERY 5 MIN PRN
Status: DISCONTINUED | OUTPATIENT
Start: 2021-05-20 | End: 2021-05-20 | Stop reason: SDUPTHER

## 2021-05-20 RX ORDER — FENTANYL CITRATE/PF 50 MCG/ML
50 PLASTIC BAG, INJECTION (ML) INTRAVENOUS EVERY 5 MIN PRN
Status: DISCONTINUED | OUTPATIENT
Start: 2021-05-20 | End: 2021-05-20 | Stop reason: HOSPADM

## 2021-05-20 RX ORDER — LIDOCAINE HYDROCHLORIDE 20 MG/ML
INJECTION, SOLUTION EPIDURAL; INFILTRATION; INTRACAUDAL; PERINEURAL AS NEEDED
Status: DISCONTINUED | OUTPATIENT
Start: 2021-05-20 | End: 2021-05-20 | Stop reason: SURG

## 2021-05-20 RX ORDER — SODIUM CITRATE AND CITRIC ACID MONOHYDRATE 334; 500 MG/5ML; MG/5ML
30 SOLUTION ORAL ONCE
Status: COMPLETED | OUTPATIENT
Start: 2021-05-20 | End: 2021-05-20

## 2021-05-20 RX ORDER — SODIUM CHLORIDE 0.9 % (FLUSH) 0.9 %
2 SYRINGE (ML) INJECTION AS NEEDED
Status: DISCONTINUED | OUTPATIENT
Start: 2021-05-20 | End: 2021-05-20 | Stop reason: HOSPADM

## 2021-05-20 RX ORDER — DEXAMETHASONE SODIUM PHOSPHATE 4 MG/ML
4 INJECTION, SOLUTION INTRA-ARTICULAR; INTRALESIONAL; INTRAMUSCULAR; INTRAVENOUS; SOFT TISSUE ONCE
Status: COMPLETED | OUTPATIENT
Start: 2021-05-20 | End: 2021-05-20

## 2021-05-20 RX ORDER — LIDOCAINE 50 MG/G
OINTMENT TOPICAL
Status: DISCONTINUED
Start: 2021-05-20 | End: 2021-05-20 | Stop reason: HOSPADM

## 2021-05-20 RX ORDER — MIDAZOLAM HYDROCHLORIDE 1 MG/ML
1 INJECTION INTRAMUSCULAR; INTRAVENOUS EVERY 5 MIN PRN
Status: DISCONTINUED | OUTPATIENT
Start: 2021-05-20 | End: 2021-05-20 | Stop reason: HOSPADM

## 2021-05-20 RX ORDER — LABETALOL HCL 20 MG/4 ML
5 SYRINGE (ML) INTRAVENOUS EVERY 5 MIN PRN
Status: DISCONTINUED | OUTPATIENT
Start: 2021-05-20 | End: 2021-05-20 | Stop reason: HOSPADM

## 2021-05-20 RX ORDER — ONDANSETRON HYDROCHLORIDE 2 MG/ML
4 INJECTION, SOLUTION INTRAVENOUS ONCE AS NEEDED
Status: DISCONTINUED | OUTPATIENT
Start: 2021-05-20 | End: 2021-05-20 | Stop reason: HOSPADM

## 2021-05-20 RX ORDER — DROPERIDOL 2.5 MG/ML
0.62 INJECTION, SOLUTION INTRAMUSCULAR; INTRAVENOUS ONCE AS NEEDED
Status: DISCONTINUED | OUTPATIENT
Start: 2021-05-20 | End: 2021-05-20 | Stop reason: HOSPADM

## 2021-05-20 RX ADMIN — IPRATROPIUM BROMIDE AND ALBUTEROL SULFATE 3 ML: .5; 3 SOLUTION RESPIRATORY (INHALATION) at 07:05

## 2021-05-20 RX ADMIN — FENTANYL CITRATE 50 MCG: 50 INJECTION, SOLUTION INTRAMUSCULAR; INTRAVENOUS at 07:24

## 2021-05-20 RX ADMIN — FENTANYL CITRATE 50 MCG: 50 INJECTION, SOLUTION INTRAMUSCULAR; INTRAVENOUS at 07:30

## 2021-05-20 RX ADMIN — PROPOFOL 50 MG: 10 INJECTION, EMULSION INTRAVENOUS at 07:27

## 2021-05-20 RX ADMIN — PROPOFOL 150 MG: 10 INJECTION, EMULSION INTRAVENOUS at 07:24

## 2021-05-20 RX ADMIN — LIDOCAINE HYDROCHLORIDE 50 MG: 20 INJECTION, SOLUTION EPIDURAL; INFILTRATION; INTRACAUDAL; PERINEURAL at 07:24

## 2021-05-20 RX ADMIN — PROPOFOL 50 MG: 10 INJECTION, EMULSION INTRAVENOUS at 07:32

## 2021-05-20 RX ADMIN — SODIUM CHLORIDE, POTASSIUM CHLORIDE, SODIUM LACTATE AND CALCIUM CHLORIDE 100 ML/HR: 600; 310; 30; 20 INJECTION, SOLUTION INTRAVENOUS at 06:56

## 2021-05-20 RX ADMIN — DEXMEDETOMIDINE HYDROCHLORIDE 4 MCG: 4 INJECTION, SOLUTION INTRAVENOUS at 07:31

## 2021-05-20 RX ADMIN — ONDANSETRON 4 MG: 2 INJECTION INTRAMUSCULAR; INTRAVENOUS at 07:07

## 2021-05-20 RX ADMIN — SODIUM CITRATE AND CITRIC ACID MONOHYDRATE 30 ML: 500; 334 SOLUTION ORAL at 07:08

## 2021-05-20 RX ADMIN — FAMOTIDINE 20 MG: 10 INJECTION INTRAVENOUS at 07:08

## 2021-05-20 RX ADMIN — DEXAMETHASONE SODIUM PHOSPHATE 4 MG: 4 INJECTION, SOLUTION INTRAMUSCULAR; INTRAVENOUS at 07:07

## 2021-05-20 ASSESSMENT — ENCOUNTER SYMPTOMS: EXERCISE TOLERANCE: GOOD (>7 METS)

## 2021-05-20 NOTE — PROCEDURES
"Bronchoscopy    Date/Time: 5/20/2021 5:11 PM  Performed by: Andres Rios MD  Authorized by: Andres Rios MD   Consent: Written consent obtained.  Risks and benefits: risks, benefits and alternatives were discussed  Consent given by: patient  Patient understanding: patient states understanding of the procedure being performed  Patient consent: the patient's understanding of the procedure matches consent given  Procedure consent: procedure consent matches procedure scheduled  Relevant documents: relevant documents present and verified  Imaging studies: imaging studies available  Patient identity confirmed: verbally with patient, provided demographic data, arm band and hospital-assigned identification number  Time out: Immediately prior to procedure a \"time out\" was called to verify the correct patient, procedure, equipment, support staff and site/side marked as required.    Sedation:  Patient sedated: yes  Sedatives: see MAR for details  Analgesia: see MAR for details  Vitals: Vital signs were monitored during sedation.    Patient tolerance: patient tolerated the procedure well with no immediate complications  Comments: After patient was sedated and LMA placed per anesthesia, Olympus therapeutic video bronchoscope was inserted. Larynx was mildly inflamed without discrete lesions. Vocal cords were edematous. Bronchoscope was easily passed through the vocal cords into the trachea. Trachea with scattered inflammation, although no focal mucosal lesions seen. Main vangie was sharp and mobile. Bronchial tree was visualized bilaterally to the subsegmental level. All airways were patent without endobronchial mass lesions. Throughout all airways, mucosa was inflamed and irregular with scattered areas of thick tenacious yellowish sputum. A BAL was performed in the right middle lobe medial subsegment with instillation of 60 mL saline and return of 30 mL cloudy yellowish fluid. A BAL was obtained from the right upper " lobe anterior subsegment with instillation of 60 mL saline and return of 30 mL frothy clear fluid. A protected specimen microbiology brush was obtained in the right middle lobe medial subsegment. All samples to be sent for cultures.    Patient tolerated procedure well without apparent complication. She was transferred to PACU in stable condition.    Procedure time 9minutes, from 7:30 AM-7:39 AM

## 2021-05-20 NOTE — ANESTHESIA PREPROCEDURE EVALUATION
"Pre-Procedure Assessment    Patient: Valorie Hermosillo, female, 71 y.o.    Ht Readings from Last 1 Encounters:   19 1.727 m (5' 8\")     Wt Readings from Last 1 Encounters:   19 55.3 kg (122 lb)       Last Vitals  BP      Temp      Pulse     Resp      SpO2      Pain Score         Problem list reviewed and Medical history reviewed           Airway   Mallampati: II      Dental      Pulmonary    (+) decreased breath sounds, wheezes,     ROS comment:    Prior 25-pack-year history of tobacco, quit 10-12 years ago.  No unusual exposures.     Contracted Covid 2020 with mild illness.  She not require hospitalization or oxygenation.  Was treated with bamlanivimab        Cardiovascular   Exercise tolerance: good (>7 METS)    Rhythm: regular  Rate: normal    Mental Status/Neuro/Psych    Pt is alert.      (+) arthritis (rheumatoid arthritis),     Comments: Sjogren's disease     GI/Hepatic/Renal      Endo/Other    Abdominal           Social History     Tobacco Use   • Smoking status: Former Smoker     Years: 25.00     Quit date: 2009     Years since quittin.0   • Smokeless tobacco: Never Used   Substance Use Topics   • Alcohol use: Yes     Comment: wine 1-2 glasses 3-4 nights a week      Hematology   WBC   Date Value Ref Range Status   2019 4.8 4.5 - 10.5 10*3/uL Final     RBC   Date Value Ref Range Status   2019 4.29 3.70 - 5.30 10*6/µL Final     MCV   Date Value Ref Range Status   2019 99.4 (H) 81.0 - 97.0 fL Final     Hemoglobin   Date Value Ref Range Status   2019 14.6 11.5 - 15.5 g/dL Final     Hematocrit   Date Value Ref Range Status   2019 42.6 34.0 - 45.0 % Final     Platelets   Date Value Ref Range Status   2019 256 140 - 350 10*3/uL Final      Coagulation No results found for: PT, APTT, INR   General Chemistry No results found for: POCGLU, CALCIUM, BUN, CREATININE, GLUCOSE, NA, K, MG, CO2, CL, DIGOXIN  Anesthesia Plan    ASA 3   NPO status reviewed: > 8 " hours    General         Induction: intravenous       Additional Comments: Sjogren's disease .... please lube eyes          Anesthetic plan and risks discussed with patient.

## 2021-05-20 NOTE — ANESTHESIA POSTPROCEDURE EVALUATION
Patient: Valorie Hermosillo    Procedure Summary     Date: 05/20/21 Room / Location: [facility] ENDO 03 / [facility] Endoscopy    Anesthesia Start: 0721 Anesthesia Stop: 0753    Procedure: BRONCHOSCOPY with bronchoaveolar lavage (N/A Bronchus) Diagnosis:       Pulmonary infiltrate      (infiltrate)    Providers: Andres Rios MD Responsible Provider: Jordan Irene MD    Anesthesia Type: general ASA Status: 3          Anesthesia Type: general    Last vitals  Vitals Value Taken Time   /57 05/20/21 0820   Temp 36.5 °C (97.7 °F) 05/20/21 0820   Pulse 71 05/20/21 0820   Resp 16 05/20/21 0820   SpO2 95 % 05/20/21 0820   0-10 Pain Score 0 - No pain 05/20/21 0820       Anesthesia Post Evaluation    Patient location during evaluation: PACU  Patient participation: complete - patient participated  Level of consciousness: awake and alert  Pain management: adequate  Airway patency: patent  Anesthetic complications: no  Cardiovascular status: acceptable  Respiratory status: acceptable  Hydration status: acceptable  May dismiss recovered patient based on consultation with the appropriate physicians and/or meeting appropriate discharge criteria      Cosmetic?  This procedure is not cosmetic.

## 2021-05-20 NOTE — ANESTHESIA PROCEDURE NOTES
Airway  Urgency: elective    Airway not difficult    General Information and Staff    Patient location during procedure: OR  Anesthesiologist: Jordan Irene MD  CRNA: Brionna Calhoun CRNA  Performed: CRNA     Indications and Patient Condition  Indications for airway management: airway protection and anesthesia  Spontaneous Ventilation: absent  Sedation level: deep  Preoxygenated: yes  Patient position: sniffing  MILS maintained throughout  Mask difficulty assessment: 0 - not attempted    Final Airway Details  Final airway type: supraglottic airway      Successful airway: unique  Size 4  SGA cuff pressure (cm H2O): Minnimal Occlusive Pressure.  Placement verified by: chest auscultation, capnometry and palpation of cuff   Number of attempts at approach: 1

## 2021-05-20 NOTE — H&P
Patient seen and examined today and the office visit note from 5/4/21 that serves as the H&P for todays's procedure reviewed. Lungs clear with good air movement.  There have been no changes, and informed consent was obtained.  We had discussed the possibility of attempt to biopsy the visible 4R lymph node.  However, the yield for any results that would change the management, and after reviewing the CT I do not feel that it is indicated.  She understands and agrees.

## 2021-05-20 NOTE — DISCHARGE INSTRUCTIONS
Post-bronchoscopy instructions  After bronchoscopy, you must not drive an automobile or operate heavy machinery, because of the lingering effects of sedation. A family member or friend must be available to drive or accompany you home.      Once at home, you may have a mild sore throat, hoarseness, cough, or muscle aches. This is normal. However, you should call pulmonary clinic ([phone]) or present to the emergency department immediately if you have acute chest pain, progressive shortness of breath, or if you cough up more than a few tablespoons of blood.    Low-grade fever (temperature up to 100.5) can occur after bronchoscopy, but usually for only for 24-36 hours. If fevers persist beyond a few days or exceed 101?F, you should contact  pulmonary clinic ([phone]).  Acetaminophen (Tylenol) or ibuprofen (Advil / Motrin) can be used for symptomatic relief unless these would be a problem due to other medical conditions, such as liver or kidney disease.       Dr. Rios will contact you within 1 week to discuss results and arrange any additional evaluation and follow-up visits.

## 2021-05-21 LAB — MAYO CULTURE AND STAIN: NORMAL

## 2021-05-22 LAB
BACTERIA ISLT CULT: NORMAL
CMV DNA SPEC QL NAA+PROBE: NEGATIVE
P JIROVECII DNA L RESP QL NAA+NON-PROBE: NEGATIVE
RHODAMINE-AURAMINE STN SPEC: NORMAL
SPECIMEN SOURCE: NORMAL
SPECIMEN SOURCE: NORMAL

## 2021-05-23 ENCOUNTER — DOCUMENTATION (OUTPATIENT)
Dept: PULMONOLOGY | Facility: CLINIC | Age: 71
End: 2021-05-23

## 2021-05-23 LAB — RHODAMINE-AURAMINE STN SPEC: NORMAL

## 2021-05-23 NOTE — PROGRESS NOTES
Recent bronchoscopy with inflamed, irregular mucosa throughout with thick tenacious sputum.  Protected microbiology brush specimen showing many colonies of microaerophilic Streptococcus species.  Will treat with penicillin V 500 mg twice a day x10 days.  Will follow up in clinic week of June 14.  Can cancel upcoming swallow study, but will likely reschedule at some point.  Discussed with patient and all questions answered.  She will contact the clinic with any further concerns in the interim

## 2021-05-24 LAB
BACTERIA ISLT CULT: ABNORMAL
BACTERIA ISLT CULT: ABNORMAL
BACTERIA ISLT CULT: NORMAL
GRAM STN SPEC: ABNORMAL

## 2021-06-07 LAB
BACTERIA ISLT CULT: NORMAL
KINYOUN IH STN SPEC: NORMAL

## 2021-06-14 ENCOUNTER — CLINICAL SUPPORT (OUTPATIENT)
Dept: FAMILY MEDICINE | Facility: CLINIC | Age: 71
End: 2021-06-14
Payer: MEDICARE

## 2021-06-14 ENCOUNTER — OFFICE VISIT (OUTPATIENT)
Dept: PULMONOLOGY | Facility: CLINIC | Age: 71
End: 2021-06-14
Payer: MEDICARE

## 2021-06-14 VITALS
DIASTOLIC BLOOD PRESSURE: 74 MMHG | SYSTOLIC BLOOD PRESSURE: 102 MMHG | BODY MASS INDEX: 18.79 KG/M2 | OXYGEN SATURATION: 95 % | HEIGHT: 68 IN | RESPIRATION RATE: 16 BRPM | HEART RATE: 77 BPM | WEIGHT: 124 LBS

## 2021-06-14 DIAGNOSIS — R91.8 PULMONARY INFILTRATE: ICD-10-CM

## 2021-06-14 DIAGNOSIS — R05.3 CHRONIC COUGH: Primary | ICD-10-CM

## 2021-06-14 DIAGNOSIS — Z23 ENCOUNTER FOR VACCINATION: Primary | ICD-10-CM

## 2021-06-14 DIAGNOSIS — K21.9 GASTROESOPHAGEAL REFLUX DISEASE, UNSPECIFIED WHETHER ESOPHAGITIS PRESENT: ICD-10-CM

## 2021-06-14 PROCEDURE — G0463 HOSPITAL OUTPT CLINIC VISIT: HCPCS | Mod: PO | Performed by: INTERNAL MEDICINE

## 2021-06-14 PROCEDURE — 99214 OFFICE O/P EST MOD 30 MIN: CPT | Performed by: INTERNAL MEDICINE

## 2021-06-14 PROCEDURE — 0001A PFIZER-BIONTECH SARS-COV-2 VACCINE: CPT | Mod: PO,NC | Performed by: PHYSICIAN ASSISTANT

## 2021-06-14 RX ORDER — MONTELUKAST SODIUM 10 MG/1
10 TABLET ORAL NIGHTLY
Qty: 30 TABLET | Refills: 11 | Status: SHIPPED | OUTPATIENT
Start: 2021-06-14 | End: 2021-08-29

## 2021-06-14 ASSESSMENT — PAIN SCALES - GENERAL: PAINLEVEL: 7

## 2021-06-14 NOTE — PROGRESS NOTES
Assessment      1. Gastroesophageal reflux disease, unspecified whether esophagitis present    2. Chronic cough    3. Pulmonary infiltrate            Plan   1.  Right middle lobe infiltrate.  Bronchoscopy was reassuring, microaerophilic streptococcus seen likely incidental finding.  Symptoms untreated after treatment.  We will repeat CT to reassess.    2.  Chronic cough and wheezing.  Agree with ongoing treatment for reflux.  Will have her schedule swallow study as previously planned.  Also start montelukast daily.  Reviewed indications and reinforced the importance of for the next taking daily.  Recommended that she take albuterol 2 puffs every morning this week along with as needed dosing cough is more significant.  After 1 week, changed to as needed only.  She can stop Flovent dosing.    3.  Patient is interested in COVID-19 vaccine.  First to be given today and she will arrange her low up injection to be done in [place].    4.  Okay to start Cimzia from a pulmonary standpoint    5.  Telehealth follow-up in 2-3 months to reassess.       On this date of service 34 minutes of total time was spent on this encounter (from 1349-585 and 0989-8511).  This included preappointment review of chart, counseling, documentation, discussion of testing options, and shared decision-making.      HPI  Valorie Hermosillo is a 71 y.o. year old female who returns chronic cough.  Since initial consult May 2021 underwent bronchoscopy to evaluate right middle lobe infiltrate with tree-in-bud pattern.  Protected brush specimen found microaerophilic Streptococcus that we treated with penicillin x10 days (starting 5/23.    Unfortunately her cough is unchanged.  Although predominant in the morning, remains sporadic throughout the day and does worsen when lying supine.  Reflux and remains well treated on PPI.    No fevers, chills or night sweats.  No new chest pain or pleuritic symptoms.    Uses Flovent sporadically with rare dosing of  "albuterol.    Has travel to [place], cocci antibodies negative.    Primary complaint of methotrexate and chronic pains today    Prior 25-pack-year history of tobacco, quit 10-12 years ago.  No unusual exposures.    Contracted Covid December 2020 with mild illness.  She not require hospitalization or oxygenation.  Was treated with bamlanivimab                Objective     /74   Pulse 77   Resp 16   Ht 1.727 m (5' 8\")   Wt 56.2 kg (124 lb)   SpO2 95%   BMI 18.85 kg/m²       Physical Exam  Vitals reviewed.   Constitutional:       General: She is not in acute distress.     Appearance: She is normal weight.   HENT:      Nose:      Comments: No sinus tenderness  Cardiovascular:      Rate and Rhythm: Normal rate and regular rhythm.   Pulmonary:      Effort: Pulmonary effort is normal.      Breath sounds: No stridor. No wheezing or rhonchi.   Musculoskeletal:      Cervical back: Neck supple.      Right lower leg: No edema.      Left lower leg: No edema.   Lymphadenopathy:      Cervical: No cervical adenopathy.   Skin:     General: Skin is warm and dry.   Neurological:      Mental Status: She is alert and oriented to person, place, and time.   Psychiatric:         Behavior: Behavior normal.                  PFT  Ordered, not done        Lab            No lab exists for component: LABALBU                              The following have been reviewed and updated as appropriate in this visit:      Review of Systems  Per HPI.  Otherwise complete 10 system review unremarkable.      Past Medical History:   Diagnosis Date   • RA (rheumatoid arthritis) (CMS/HCC) ([facility])          Past Surgical History:   Procedure Laterality Date   • BRONCHOSCOPY N/A 5/20/2021    Procedure: BRONCHOSCOPY with bronchoaveolar lavage;  Surgeon: Andres Rios MD;  Location: [facility] Endoscopy;  Service: Endoscopy;  Laterality: N/A;   • HYSTERECTOMY            Social History     Tobacco Use   • Smoking status: Former Smoker     Years: 25.00     " Quit date: 2009     Years since quittin.1   • Smokeless tobacco: Never Used   Substance Use Topics   • Alcohol use: Yes     Comment: wine 1-2 glasses 3-4 nights a week   • Drug use: Never          Family History   Problem Relation Age of Onset   • Cancer Father    • Cancer Sister             Current Outpatient Medications:   •  fluticasone propionate (Flovent Diskus) 100 mcg/actuation diskus inhaler, Inhale 1 puff as needed Rinse mouth with water after use. Do not swallow., Disp: , Rfl:   •  ascorbic acid/collagen hydr (COLLAGEN PLUS VITAMIN C ORAL), Take by mouth, Disp: , Rfl:   •  SULFASALAZINE ORAL, Take by mouth, Disp: , Rfl:   •  hydroxychloroquine (PLAQUENIL) 200 mg tablet, Take by mouth daily, Disp: , Rfl:   •  montelukast (SINGULAIR) 10 mg tablet, Take 1 tablet (10 mg total) by mouth nightly, Disp: 30 tablet, Rfl: 11  •  BIOTIN ORAL, Take by mouth, Disp: , Rfl:   •  folic acid 1 mg tablet, Take 2,000 mcg by mouth, Disp: , Rfl: 11  •  METHOTREXATE ORAL, Take by mouth, Disp: , Rfl:               A voice recognition program was used to aid in documentation of this record.  Sometimes words are not printed exactly as they were spoken.  While efforts were made to carefully edit and correct any inaccuracies, some errors may be present; please take these into context.  Please contact the provider if areas are identified.

## 2021-06-21 ENCOUNTER — HOSPITAL ENCOUNTER (OUTPATIENT)
Dept: FLUOROSCOPY | Facility: HOSPITAL | Age: 71
Discharge: HOME | End: 2021-06-21
Payer: MEDICARE

## 2021-06-21 DIAGNOSIS — K21.9 GASTROESOPHAGEAL REFLUX DISEASE, UNSPECIFIED WHETHER ESOPHAGITIS PRESENT: ICD-10-CM

## 2021-06-21 PROCEDURE — 74220 X-RAY XM ESOPHAGUS 1CNTRST: CPT

## 2021-06-21 PROCEDURE — 2590000100 HC RX 259: Performed by: RADIOLOGY

## 2021-06-21 RX ADMIN — ANTACID/ANTIFLATULENT 1 PACKET: 380; 550; 10; 10 GRANULE, EFFERVESCENT ORAL at 08:07

## 2021-06-22 ENCOUNTER — HOSPITAL ENCOUNTER (OUTPATIENT)
Dept: CT IMAGING | Facility: HOSPITAL | Age: 71
Discharge: HOME | End: 2021-06-22
Payer: MEDICARE

## 2021-06-22 DIAGNOSIS — R05.3 CHRONIC COUGH: ICD-10-CM

## 2021-06-22 DIAGNOSIS — R91.8 PULMONARY INFILTRATE: ICD-10-CM

## 2021-06-22 PROCEDURE — 71250 CT THORAX DX C-: CPT

## 2021-06-23 ENCOUNTER — TELEPHONE (OUTPATIENT)
Dept: PULMONOLOGY | Facility: CLINIC | Age: 71
End: 2021-06-23

## 2021-06-23 NOTE — TELEPHONE ENCOUNTER
Pt calls for results of CT scan. States her Rheumatologist is awaiting these results as well for medication management. Please advise.

## 2021-06-28 LAB
BACTERIA ISLT CULT: NORMAL
BACTERIA ISLT CULT: NORMAL
PAS STN SPEC: NORMAL
PAS STN SPEC: NORMAL

## 2021-06-29 NOTE — TELEPHONE ENCOUNTER
Spoke with patient and reviewed results of cultures and CT chest.  AFB cultures show no growth to date.  CT is stable with minor middle lobe and lingular atelectasis with some nodularity.  Findings are unchanged from December 2020.  Unlikely to represent active infection with negative bronchoscopy results.  I would be comfortable with her starting Cimzia once AFB samples are finalized.  Once available, will send documentation to Dr. Maryana Ríos in [place] (fax [phone])

## 2021-07-03 LAB — MYCOBACTERIUM SPEC QL CULT: NORMAL

## 2021-07-04 LAB — MYCOBACTERIUM SPEC QL CULT: NORMAL

## 2021-07-05 ENCOUNTER — CLINICAL SUPPORT (OUTPATIENT)
Dept: FAMILY MEDICINE | Facility: CLINIC | Age: 71
End: 2021-07-05
Payer: MEDICARE

## 2021-07-05 DIAGNOSIS — Z23 ENCOUNTER FOR VACCINATION: Primary | ICD-10-CM

## 2021-07-05 PROCEDURE — 0002A PFIZER-BIONTECH SARS-COV-2 VACCINE: CPT | Mod: PO

## 2021-07-06 ENCOUNTER — DOCUMENTATION (OUTPATIENT)
Dept: PULMONOLOGY | Facility: CLINIC | Age: 71
End: 2021-07-06

## 2021-07-06 NOTE — LETTER
[facility] PULMONOLOGY  [address] [place] [id number] [phone]  July 6, 2021      Valorie Hermosillo  [address] [place] [address]      Dear Dr. Ríos     Evaluation on  has been unremarkable to date.  Bronchoscopy samples were negative for fungus or atypical mycobacteria.  Repeat CT chest showed stability of minor right middle lobe and lingular atelectasis and nodularity.  With negative endoscopy, active infection is much less likely.  At this point I would feel comfortable with her starting Cimzia if otherwise clinically appropriate.    If you have any questions or concerns, please don't hesitate to call.    Sincerely,    Andres Rios MD        CC: No Recipients

## 2021-07-06 NOTE — PROGRESS NOTES
Communication for clinical update to be faxed to Dr. Maryana Ríos, rheumatologist in [place] (fax [phone])

## 2021-07-12 DIAGNOSIS — M05.79 RHEUMATOID ARTHRITIS INVOLVING MULTIPLE SITES WITH POSITIVE RHEUMATOID FACTOR (CMS/HCC): Primary | ICD-10-CM

## 2021-07-12 RX ORDER — ALBUTEROL SULFATE 0.83 MG/ML
2.5 SOLUTION RESPIRATORY (INHALATION) AS NEEDED
Status: CANCELLED | OUTPATIENT
Start: 2022-07-04

## 2021-07-12 RX ORDER — ALBUTEROL SULFATE 0.83 MG/ML
2.5 SOLUTION RESPIRATORY (INHALATION) AS NEEDED
Status: CANCELLED | OUTPATIENT
Start: 2022-05-09

## 2021-07-12 RX ORDER — ALBUTEROL SULFATE 0.83 MG/ML
2.5 SOLUTION RESPIRATORY (INHALATION) AS NEEDED
Status: CANCELLED | OUTPATIENT
Start: 2021-12-20

## 2021-07-12 RX ORDER — DIPHENHYDRAMINE HYDROCHLORIDE 50 MG/ML
25-50 INJECTION INTRAMUSCULAR; INTRAVENOUS AS NEEDED
Status: CANCELLED | OUTPATIENT
Start: 2022-04-11

## 2021-07-12 RX ORDER — ACETAMINOPHEN 500 MG
500 TABLET ORAL EVERY 4 HOURS PRN
Status: CANCELLED | OUTPATIENT
Start: 2022-02-14

## 2021-07-12 RX ORDER — DIPHENHYDRAMINE HYDROCHLORIDE 50 MG/ML
25-50 INJECTION INTRAMUSCULAR; INTRAVENOUS AS NEEDED
Status: CANCELLED | OUTPATIENT
Start: 2022-05-09

## 2021-07-12 RX ORDER — SODIUM CHLORIDE 9 MG/ML
0-999 INJECTION, SOLUTION INTRAVENOUS CONTINUOUS PRN
Status: CANCELLED | OUTPATIENT
Start: 2021-12-20 | End: 2021-12-01

## 2021-07-12 RX ORDER — FAMOTIDINE 10 MG/ML
20 INJECTION INTRAVENOUS AS NEEDED
Status: CANCELLED | OUTPATIENT
Start: 2022-02-14

## 2021-07-12 RX ORDER — DIPHENHYDRAMINE HYDROCHLORIDE 50 MG/ML
25-50 INJECTION INTRAMUSCULAR; INTRAVENOUS AS NEEDED
Status: CANCELLED | OUTPATIENT
Start: 2022-06-06

## 2021-07-12 RX ORDER — DIPHENHYDRAMINE HYDROCHLORIDE 50 MG/ML
25-50 INJECTION INTRAMUSCULAR; INTRAVENOUS AS NEEDED
Status: CANCELLED | OUTPATIENT
Start: 2022-01-17

## 2021-07-12 RX ORDER — ALBUTEROL SULFATE 0.83 MG/ML
2.5 SOLUTION RESPIRATORY (INHALATION) AS NEEDED
Status: CANCELLED | OUTPATIENT
Start: 2022-04-11

## 2021-07-12 RX ORDER — ACETAMINOPHEN 500 MG
500 TABLET ORAL EVERY 4 HOURS PRN
Status: CANCELLED | OUTPATIENT
Start: 2022-04-11

## 2021-07-12 RX ORDER — SODIUM CHLORIDE 9 MG/ML
0-999 INJECTION, SOLUTION INTRAVENOUS CONTINUOUS PRN
Status: CANCELLED | OUTPATIENT
Start: 2022-05-09 | End: 2022-04-20

## 2021-07-12 RX ORDER — SODIUM CHLORIDE 9 MG/ML
0-999 INJECTION, SOLUTION INTRAVENOUS CONTINUOUS PRN
Status: CANCELLED | OUTPATIENT
Start: 2022-02-14 | End: 2022-01-26

## 2021-07-12 RX ORDER — DIPHENHYDRAMINE HYDROCHLORIDE 50 MG/ML
25-50 INJECTION INTRAMUSCULAR; INTRAVENOUS AS NEEDED
Status: CANCELLED | OUTPATIENT
Start: 2022-07-04

## 2021-07-12 RX ORDER — FAMOTIDINE 10 MG/ML
20 INJECTION INTRAVENOUS AS NEEDED
Status: CANCELLED | OUTPATIENT
Start: 2022-07-12

## 2021-07-12 RX ORDER — FAMOTIDINE 10 MG/ML
20 INJECTION INTRAVENOUS AS NEEDED
Status: CANCELLED | OUTPATIENT
Start: 2022-04-11

## 2021-07-12 RX ORDER — FAMOTIDINE 10 MG/ML
20 INJECTION INTRAVENOUS AS NEEDED
Status: CANCELLED | OUTPATIENT
Start: 2022-06-06

## 2021-07-12 RX ORDER — DIPHENHYDRAMINE HYDROCHLORIDE 50 MG/ML
25-50 INJECTION INTRAMUSCULAR; INTRAVENOUS AS NEEDED
Status: CANCELLED | OUTPATIENT
Start: 2022-07-12

## 2021-07-12 RX ORDER — FAMOTIDINE 10 MG/ML
20 INJECTION INTRAVENOUS AS NEEDED
Status: CANCELLED | OUTPATIENT
Start: 2022-07-04

## 2021-07-12 RX ORDER — EPINEPHRINE 1 MG/ML
0.3 INJECTION INTRAMUSCULAR; INTRAVENOUS; SUBCUTANEOUS AS NEEDED
Status: CANCELLED | OUTPATIENT
Start: 2022-05-09

## 2021-07-12 RX ORDER — FAMOTIDINE 10 MG/ML
20 INJECTION INTRAVENOUS AS NEEDED
Status: CANCELLED | OUTPATIENT
Start: 2022-03-14

## 2021-07-12 RX ORDER — FAMOTIDINE 10 MG/ML
20 INJECTION INTRAVENOUS AS NEEDED
Status: CANCELLED | OUTPATIENT
Start: 2022-05-09

## 2021-07-12 RX ORDER — ACETAMINOPHEN 500 MG
500 TABLET ORAL EVERY 4 HOURS PRN
Status: CANCELLED | OUTPATIENT
Start: 2022-07-12

## 2021-07-12 RX ORDER — SODIUM CHLORIDE 9 MG/ML
0-999 INJECTION, SOLUTION INTRAVENOUS CONTINUOUS PRN
Status: CANCELLED | OUTPATIENT
Start: 2022-07-12 | End: 2022-07-13

## 2021-07-12 RX ORDER — ALBUTEROL SULFATE 0.83 MG/ML
2.5 SOLUTION RESPIRATORY (INHALATION) AS NEEDED
Status: CANCELLED | OUTPATIENT
Start: 2022-03-14

## 2021-07-12 RX ORDER — EPINEPHRINE 1 MG/ML
0.3 INJECTION INTRAMUSCULAR; INTRAVENOUS; SUBCUTANEOUS AS NEEDED
Status: CANCELLED | OUTPATIENT
Start: 2022-07-12

## 2021-07-12 RX ORDER — EPINEPHRINE 1 MG/ML
0.3 INJECTION INTRAMUSCULAR; INTRAVENOUS; SUBCUTANEOUS AS NEEDED
Status: CANCELLED | OUTPATIENT
Start: 2022-04-11

## 2021-07-12 RX ORDER — SODIUM CHLORIDE 9 MG/ML
0-999 INJECTION, SOLUTION INTRAVENOUS CONTINUOUS PRN
Status: CANCELLED | OUTPATIENT
Start: 2022-04-11 | End: 2022-03-23

## 2021-07-12 RX ORDER — EPINEPHRINE 1 MG/ML
0.3 INJECTION INTRAMUSCULAR; INTRAVENOUS; SUBCUTANEOUS AS NEEDED
Status: CANCELLED | OUTPATIENT
Start: 2022-07-04

## 2021-07-12 RX ORDER — SODIUM CHLORIDE 9 MG/ML
0-999 INJECTION, SOLUTION INTRAVENOUS CONTINUOUS PRN
Status: CANCELLED | OUTPATIENT
Start: 2022-01-17 | End: 2021-12-29

## 2021-07-12 RX ORDER — ALBUTEROL SULFATE 0.83 MG/ML
2.5 SOLUTION RESPIRATORY (INHALATION) AS NEEDED
Status: CANCELLED | OUTPATIENT
Start: 2022-01-17

## 2021-07-12 RX ORDER — ACETAMINOPHEN 500 MG
500 TABLET ORAL EVERY 4 HOURS PRN
Status: CANCELLED | OUTPATIENT
Start: 2021-12-20

## 2021-07-12 RX ORDER — ACETAMINOPHEN 500 MG
500 TABLET ORAL EVERY 4 HOURS PRN
Status: CANCELLED | OUTPATIENT
Start: 2022-07-04

## 2021-07-12 RX ORDER — ALBUTEROL SULFATE 0.83 MG/ML
2.5 SOLUTION RESPIRATORY (INHALATION) AS NEEDED
Status: CANCELLED | OUTPATIENT
Start: 2022-02-14

## 2021-07-12 RX ORDER — ALBUTEROL SULFATE 0.83 MG/ML
2.5 SOLUTION RESPIRATORY (INHALATION) AS NEEDED
Status: CANCELLED | OUTPATIENT
Start: 2022-06-06

## 2021-07-12 RX ORDER — FAMOTIDINE 10 MG/ML
20 INJECTION INTRAVENOUS AS NEEDED
Status: CANCELLED | OUTPATIENT
Start: 2022-01-17

## 2021-07-12 RX ORDER — DIPHENHYDRAMINE HYDROCHLORIDE 50 MG/ML
25-50 INJECTION INTRAMUSCULAR; INTRAVENOUS AS NEEDED
Status: CANCELLED | OUTPATIENT
Start: 2022-02-14

## 2021-07-12 RX ORDER — DIPHENHYDRAMINE HYDROCHLORIDE 50 MG/ML
25-50 INJECTION INTRAMUSCULAR; INTRAVENOUS AS NEEDED
Status: CANCELLED | OUTPATIENT
Start: 2021-12-20

## 2021-07-12 RX ORDER — DIPHENHYDRAMINE HYDROCHLORIDE 50 MG/ML
25-50 INJECTION INTRAMUSCULAR; INTRAVENOUS AS NEEDED
Status: CANCELLED | OUTPATIENT
Start: 2022-03-14

## 2021-07-12 RX ORDER — SODIUM CHLORIDE 9 MG/ML
0-999 INJECTION, SOLUTION INTRAVENOUS CONTINUOUS PRN
Status: CANCELLED | OUTPATIENT
Start: 2022-03-14 | End: 2022-02-23

## 2021-07-12 RX ORDER — EPINEPHRINE 1 MG/ML
0.3 INJECTION INTRAMUSCULAR; INTRAVENOUS; SUBCUTANEOUS AS NEEDED
Status: CANCELLED | OUTPATIENT
Start: 2021-12-20

## 2021-07-12 RX ORDER — EPINEPHRINE 1 MG/ML
0.3 INJECTION INTRAMUSCULAR; INTRAVENOUS; SUBCUTANEOUS AS NEEDED
Status: CANCELLED | OUTPATIENT
Start: 2022-01-17

## 2021-07-12 RX ORDER — ACETAMINOPHEN 500 MG
500 TABLET ORAL EVERY 4 HOURS PRN
Status: CANCELLED | OUTPATIENT
Start: 2022-03-14

## 2021-07-12 RX ORDER — ALBUTEROL SULFATE 0.83 MG/ML
2.5 SOLUTION RESPIRATORY (INHALATION) AS NEEDED
Status: CANCELLED | OUTPATIENT
Start: 2022-07-12

## 2021-07-12 RX ORDER — ACETAMINOPHEN 500 MG
500 TABLET ORAL EVERY 4 HOURS PRN
Status: CANCELLED | OUTPATIENT
Start: 2022-06-06

## 2021-07-12 RX ORDER — EPINEPHRINE 1 MG/ML
0.3 INJECTION INTRAMUSCULAR; INTRAVENOUS; SUBCUTANEOUS AS NEEDED
Status: CANCELLED | OUTPATIENT
Start: 2022-02-14

## 2021-07-12 RX ORDER — SODIUM CHLORIDE 9 MG/ML
0-999 INJECTION, SOLUTION INTRAVENOUS CONTINUOUS PRN
Status: CANCELLED | OUTPATIENT
Start: 2022-07-04 | End: 2022-06-15

## 2021-07-12 RX ORDER — EPINEPHRINE 1 MG/ML
0.3 INJECTION INTRAMUSCULAR; INTRAVENOUS; SUBCUTANEOUS AS NEEDED
Status: CANCELLED | OUTPATIENT
Start: 2022-06-06

## 2021-07-12 RX ORDER — SODIUM CHLORIDE 9 MG/ML
0-999 INJECTION, SOLUTION INTRAVENOUS CONTINUOUS PRN
Status: CANCELLED | OUTPATIENT
Start: 2022-06-06 | End: 2022-05-18

## 2021-07-12 RX ORDER — FAMOTIDINE 10 MG/ML
20 INJECTION INTRAVENOUS AS NEEDED
Status: CANCELLED | OUTPATIENT
Start: 2021-12-20

## 2021-07-12 RX ORDER — ACETAMINOPHEN 500 MG
500 TABLET ORAL EVERY 4 HOURS PRN
Status: CANCELLED | OUTPATIENT
Start: 2022-01-17

## 2021-07-12 RX ORDER — ACETAMINOPHEN 500 MG
500 TABLET ORAL EVERY 4 HOURS PRN
Status: CANCELLED | OUTPATIENT
Start: 2022-05-09

## 2021-07-12 RX ORDER — EPINEPHRINE 1 MG/ML
0.3 INJECTION INTRAMUSCULAR; INTRAVENOUS; SUBCUTANEOUS AS NEEDED
Status: CANCELLED | OUTPATIENT
Start: 2022-03-14

## 2021-07-26 ENCOUNTER — HOSPITAL ENCOUNTER (OUTPATIENT)
Facility: HOSPITAL | Age: 71
Discharge: HOME | End: 2021-07-26
Payer: MEDICARE

## 2021-07-26 VITALS
DIASTOLIC BLOOD PRESSURE: 69 MMHG | TEMPERATURE: 97.8 F | OXYGEN SATURATION: 98 % | HEART RATE: 60 BPM | RESPIRATION RATE: 18 BRPM | SYSTOLIC BLOOD PRESSURE: 129 MMHG

## 2021-07-26 DIAGNOSIS — M05.79 RHEUMATOID ARTHRITIS INVOLVING MULTIPLE SITES WITH POSITIVE RHEUMATOID FACTOR (CMS/HCC): Primary | ICD-10-CM

## 2021-07-26 PROCEDURE — 96372 THER/PROPH/DIAG INJ SC/IM: CPT

## 2021-07-26 PROCEDURE — 6360000200 HC RX 636 W HCPCS (ALT 250 FOR IP)

## 2021-07-26 RX ADMIN — CERTOLIZUMAB PEGOL 400 MG: KIT at 09:50

## 2021-08-09 ENCOUNTER — HOSPITAL ENCOUNTER (OUTPATIENT)
Facility: HOSPITAL | Age: 71
Discharge: HOME | End: 2021-08-09
Payer: MEDICARE

## 2021-08-09 VITALS
OXYGEN SATURATION: 98 % | HEART RATE: 58 BPM | RESPIRATION RATE: 18 BRPM | SYSTOLIC BLOOD PRESSURE: 96 MMHG | TEMPERATURE: 97.5 F | DIASTOLIC BLOOD PRESSURE: 72 MMHG

## 2021-08-09 DIAGNOSIS — M05.79 RHEUMATOID ARTHRITIS INVOLVING MULTIPLE SITES WITH POSITIVE RHEUMATOID FACTOR (CMS/HCC): Primary | ICD-10-CM

## 2021-08-09 PROCEDURE — 6360000200 HC RX 636 W HCPCS (ALT 250 FOR IP)

## 2021-08-09 PROCEDURE — 96372 THER/PROPH/DIAG INJ SC/IM: CPT

## 2021-08-09 RX ORDER — ACETAMINOPHEN 500 MG
500 TABLET ORAL EVERY 4 HOURS PRN
Status: DISCONTINUED | OUTPATIENT
Start: 2021-08-09 | End: 2021-08-10 | Stop reason: HOSPADM

## 2021-08-09 RX ORDER — DIPHENHYDRAMINE HYDROCHLORIDE 50 MG/ML
25-50 INJECTION INTRAMUSCULAR; INTRAVENOUS AS NEEDED
Status: DISCONTINUED | OUTPATIENT
Start: 2021-08-09 | End: 2021-08-10 | Stop reason: HOSPADM

## 2021-08-09 RX ORDER — EPINEPHRINE 1 MG/ML
0.3 INJECTION INTRAMUSCULAR; INTRAVENOUS; SUBCUTANEOUS AS NEEDED
Status: DISCONTINUED | OUTPATIENT
Start: 2021-08-09 | End: 2021-08-10 | Stop reason: HOSPADM

## 2021-08-09 RX ORDER — FAMOTIDINE 10 MG/ML
20 INJECTION INTRAVENOUS AS NEEDED
Status: DISCONTINUED | OUTPATIENT
Start: 2021-08-09 | End: 2021-08-10 | Stop reason: HOSPADM

## 2021-08-09 RX ORDER — SODIUM CHLORIDE 9 MG/ML
0-999 INJECTION, SOLUTION INTRAVENOUS CONTINUOUS PRN
Status: DISCONTINUED | OUTPATIENT
Start: 2021-08-09 | End: 2021-08-10 | Stop reason: HOSPADM

## 2021-08-09 RX ORDER — ALBUTEROL SULFATE 0.83 MG/ML
2.5 SOLUTION RESPIRATORY (INHALATION) AS NEEDED
Status: DISCONTINUED | OUTPATIENT
Start: 2021-08-09 | End: 2021-08-10 | Stop reason: HOSPADM

## 2021-08-09 RX ADMIN — CERTOLIZUMAB PEGOL 400 MG: KIT at 08:45

## 2021-08-23 ENCOUNTER — TELEPHONE (OUTPATIENT)
Dept: PULMONOLOGY | Facility: CLINIC | Age: 71
End: 2021-08-23

## 2021-08-23 NOTE — TELEPHONE ENCOUNTER
"Pt calls per provider    Pt has had a nonproductive cough, \"raw throat\" x last few days. Denies fever, bodyaches or shortness of breath.  Pt is due this Wednesday for her Cimzia and wanted to make sure she is OK to get it? Please advise.                                                                                                                                                                                                                                                                                                                                                          "

## 2021-08-25 ENCOUNTER — HOSPITAL ENCOUNTER (OUTPATIENT)
Facility: HOSPITAL | Age: 71
Discharge: HOME | End: 2021-08-25
Payer: MEDICARE

## 2021-08-25 VITALS
RESPIRATION RATE: 18 BRPM | SYSTOLIC BLOOD PRESSURE: 113 MMHG | DIASTOLIC BLOOD PRESSURE: 70 MMHG | HEART RATE: 63 BPM | OXYGEN SATURATION: 96 %

## 2021-08-25 DIAGNOSIS — M05.79 RHEUMATOID ARTHRITIS INVOLVING MULTIPLE SITES WITH POSITIVE RHEUMATOID FACTOR (CMS/HCC): Primary | ICD-10-CM

## 2021-08-25 PROCEDURE — 6360000200 HC RX 636 W HCPCS (ALT 250 FOR IP)

## 2021-08-25 PROCEDURE — 96372 THER/PROPH/DIAG INJ SC/IM: CPT

## 2021-08-25 RX ORDER — ALBUTEROL SULFATE 0.83 MG/ML
2.5 SOLUTION RESPIRATORY (INHALATION) AS NEEDED
Status: DISCONTINUED | OUTPATIENT
Start: 2021-08-25 | End: 2021-08-26 | Stop reason: HOSPADM

## 2021-08-25 RX ORDER — FAMOTIDINE 10 MG/ML
20 INJECTION INTRAVENOUS AS NEEDED
Status: DISCONTINUED | OUTPATIENT
Start: 2021-08-25 | End: 2021-08-26 | Stop reason: HOSPADM

## 2021-08-25 RX ORDER — SODIUM CHLORIDE 9 MG/ML
0-999 INJECTION, SOLUTION INTRAVENOUS CONTINUOUS PRN
Status: DISCONTINUED | OUTPATIENT
Start: 2021-08-25 | End: 2021-08-26 | Stop reason: HOSPADM

## 2021-08-25 RX ORDER — EPINEPHRINE 1 MG/ML
0.3 INJECTION INTRAMUSCULAR; INTRAVENOUS; SUBCUTANEOUS AS NEEDED
Status: DISCONTINUED | OUTPATIENT
Start: 2021-08-25 | End: 2021-08-26 | Stop reason: HOSPADM

## 2021-08-25 RX ORDER — ACETAMINOPHEN 500 MG
500 TABLET ORAL EVERY 4 HOURS PRN
Status: DISCONTINUED | OUTPATIENT
Start: 2021-08-25 | End: 2021-08-26 | Stop reason: HOSPADM

## 2021-08-25 RX ORDER — DIPHENHYDRAMINE HYDROCHLORIDE 50 MG/ML
25-50 INJECTION INTRAMUSCULAR; INTRAVENOUS AS NEEDED
Status: DISCONTINUED | OUTPATIENT
Start: 2021-08-25 | End: 2021-08-26 | Stop reason: HOSPADM

## 2021-08-25 RX ADMIN — CERTOLIZUMAB PEGOL 400 MG: KIT at 09:25

## 2021-08-29 ENCOUNTER — APPOINTMENT (OUTPATIENT)
Dept: RADIOLOGY | Facility: HOSPITAL | Age: 71
End: 2021-08-29
Payer: MEDICARE

## 2021-08-29 ENCOUNTER — HOSPITAL ENCOUNTER (EMERGENCY)
Facility: HOSPITAL | Age: 71
Discharge: HOME | End: 2021-08-29
Attending: FAMILY MEDICINE
Payer: MEDICARE

## 2021-08-29 VITALS
HEART RATE: 60 BPM | HEIGHT: 68 IN | WEIGHT: 124 LBS | DIASTOLIC BLOOD PRESSURE: 67 MMHG | RESPIRATION RATE: 18 BRPM | SYSTOLIC BLOOD PRESSURE: 123 MMHG | TEMPERATURE: 98.7 F | OXYGEN SATURATION: 97 % | BODY MASS INDEX: 18.79 KG/M2

## 2021-08-29 DIAGNOSIS — J45.901 ASTHMA EXACERBATION: Primary | ICD-10-CM

## 2021-08-29 LAB
ALBUMIN SERPL-MCNC: 4.6 G/DL (ref 3.5–5.3)
ALP SERPL-CCNC: 86 U/L (ref 55–142)
ALT SERPL-CCNC: 11 U/L (ref 7–52)
ANION GAP SERPL CALC-SCNC: 7 MMOL/L (ref 3–11)
AST SERPL-CCNC: 16 U/L
BASOPHILS # BLD AUTO: 0.1 10*3/UL
BASOPHILS NFR BLD AUTO: 1 % (ref 0–2)
BILIRUB SERPL-MCNC: 0.59 MG/DL (ref 0.2–1.4)
BUN SERPL-MCNC: 14 MG/DL (ref 7–25)
CALCIUM ALBUM COR SERPL-MCNC: 9.2 MG/DL (ref 8.6–10.3)
CALCIUM SERPL-MCNC: 9.7 MG/DL (ref 8.6–10.3)
CHLORIDE SERPL-SCNC: 104 MMOL/L (ref 98–107)
CO2 SERPL-SCNC: 29 MMOL/L (ref 21–32)
CREAT SERPL-MCNC: 0.69 MG/DL (ref 0.6–1.1)
CRP SERPL-MCNC: <1 MG/L
EOSINOPHIL # BLD AUTO: 0.7 10*3/UL
EOSINOPHIL NFR BLD AUTO: 10 % (ref 0–3)
ERYTHROCYTE [DISTWIDTH] IN BLOOD BY AUTOMATED COUNT: 12.8 % (ref 11.5–14)
ERYTHROCYTE [SEDIMENTATION RATE] IN BLOOD: 5 MM/HR
GFR SERPL CREATININE-BSD FRML MDRD: 89 ML/MIN/1.73M*2
GLUCOSE SERPL-MCNC: 105 MG/DL (ref 70–105)
HCT VFR BLD AUTO: 42.9 % (ref 34–45)
HGB BLD-MCNC: 14.6 G/DL (ref 11.5–15.5)
LYMPHOCYTES # BLD AUTO: 1.5 10*3/UL
LYMPHOCYTES NFR BLD AUTO: 21 % (ref 11–47)
MCH RBC QN AUTO: 32.8 PG (ref 28–33)
MCHC RBC AUTO-ENTMCNC: 34 G/DL (ref 32–36)
MCV RBC AUTO: 96.5 FL (ref 81–97)
MONOCYTES # BLD AUTO: 0.4 10*3/UL
MONOCYTES NFR BLD AUTO: 5 % (ref 3–11)
NEUTROPHILS # BLD AUTO: 4.6 10*3/UL
NEUTROPHILS NFR BLD AUTO: 63 % (ref 41–81)
PLATELET # BLD AUTO: 303 10*3/UL (ref 140–350)
PMV BLD AUTO: 7.6 FL (ref 6.9–10.8)
POTASSIUM SERPL-SCNC: 3.8 MMOL/L (ref 3.5–5.1)
PROT SERPL-MCNC: 7.5 G/DL (ref 6–8.3)
RBC # BLD AUTO: 4.44 10*6/ΜL (ref 3.7–5.3)
SARS-COV-2 RDRP RESP QL NAA+PROBE: NEGATIVE
SODIUM SERPL-SCNC: 140 MMOL/L (ref 135–145)
WBC # BLD AUTO: 7.3 10*3/UL (ref 4.5–10.5)

## 2021-08-29 PROCEDURE — 86140 C-REACTIVE PROTEIN: CPT | Performed by: FAMILY MEDICINE

## 2021-08-29 PROCEDURE — 6360000200 HC RX 636 W HCPCS (ALT 250 FOR IP): Performed by: FAMILY MEDICINE

## 2021-08-29 PROCEDURE — 96374 THER/PROPH/DIAG INJ IV PUSH: CPT

## 2021-08-29 PROCEDURE — 6370000100 HC RX 637 (ALT 250 FOR IP): Performed by: FAMILY MEDICINE

## 2021-08-29 PROCEDURE — 85025 COMPLETE CBC W/AUTO DIFF WBC: CPT | Performed by: FAMILY MEDICINE

## 2021-08-29 PROCEDURE — 80053 COMPREHEN METABOLIC PANEL: CPT | Performed by: FAMILY MEDICINE

## 2021-08-29 PROCEDURE — C9803 HOPD COVID-19 SPEC COLLECT: HCPCS | Mod: CS | Performed by: FAMILY MEDICINE

## 2021-08-29 PROCEDURE — 99283 EMERGENCY DEPT VISIT LOW MDM: CPT | Performed by: FAMILY MEDICINE

## 2021-08-29 PROCEDURE — 85652 RBC SED RATE AUTOMATED: CPT | Performed by: FAMILY MEDICINE

## 2021-08-29 PROCEDURE — 99284 EMERGENCY DEPT VISIT MOD MDM: CPT | Performed by: FAMILY MEDICINE

## 2021-08-29 PROCEDURE — 71046 X-RAY EXAM CHEST 2 VIEWS: CPT

## 2021-08-29 PROCEDURE — 87635 SARS-COV-2 COVID-19 AMP PRB: CPT | Performed by: FAMILY MEDICINE

## 2021-08-29 PROCEDURE — 36415 COLL VENOUS BLD VENIPUNCTURE: CPT | Performed by: FAMILY MEDICINE

## 2021-08-29 PROCEDURE — 99284 EMERGENCY DEPT VISIT MOD MDM: CPT

## 2021-08-29 RX ORDER — FLUTICASONE PROPIONATE 100 UG/1
1 POWDER, METERED RESPIRATORY (INHALATION) 2 TIMES DAILY
Qty: 1 INHALER | Refills: 0 | Status: SHIPPED | OUTPATIENT
Start: 2021-08-29 | End: 2021-09-21 | Stop reason: SDUPTHER

## 2021-08-29 RX ORDER — IPRATROPIUM BROMIDE AND ALBUTEROL SULFATE 2.5; .5 MG/3ML; MG/3ML
3 SOLUTION RESPIRATORY (INHALATION) ONCE
Status: COMPLETED | OUTPATIENT
Start: 2021-08-29 | End: 2021-08-29

## 2021-08-29 RX ADMIN — METHYLPREDNISOLONE SODIUM SUCCINATE 125 MG: 125 INJECTION, POWDER, FOR SOLUTION INTRAMUSCULAR; INTRAVENOUS at 10:51

## 2021-08-29 RX ADMIN — IPRATROPIUM BROMIDE AND ALBUTEROL SULFATE 3 ML: .5; 3 SOLUTION RESPIRATORY (INHALATION) at 10:51

## 2021-08-29 ASSESSMENT — ENCOUNTER SYMPTOMS
VOMITING: 0
WOUND: 0
WEAKNESS: 0
COUGH: 1
DIZZINESS: 0
ABDOMINAL PAIN: 0
FEVER: 0
FATIGUE: 0
CHEST TIGHTNESS: 0
APPETITE CHANGE: 0
SHORTNESS OF BREATH: 0
DIARRHEA: 0
VOICE CHANGE: 0
BACK PAIN: 0
ADENOPATHY: 0
UNEXPECTED WEIGHT CHANGE: 0
SINUS PRESSURE: 0
ACTIVITY CHANGE: 0

## 2021-08-29 NOTE — ED PROVIDER NOTES
HPI:  Chief Complaint   Patient presents with   • Cough     started 2 weeks ago increasing over time last night became worse felt like chest was full       HPI  The patient is a 71-year-old female presents to the emergency room secondary to cough x14 days.  The patient reports that her cough seems to be worsening over the last 2 days.  She reports a congested-like cough, feels like she has chest congestion, has not had any fever, chills or body aches.  The patient does have history of asthma and currently only using her albuterol inhaler every 4-6 hours.  She reports it feels like her throat is very dry and scratchy, but the cough thus far has also been nonproductive.  She currently has RA and is taking an injection.  Patient recently underwent a bronc with pulmonology back in May.  Her other medications including her Flovent have been discontinued since that time, reporting approximately 2 months ago..    HISTORY:  Past Medical History:   Diagnosis Date   • RA (rheumatoid arthritis) (CMS/HCC) (HCC)        Past Surgical History:   Procedure Laterality Date   • BRONCHOSCOPY N/A 2021    Procedure: BRONCHOSCOPY with bronchoaveolar lavage;  Surgeon: Andres Rios MD;  Location: [facility];  Service: Endoscopy;  Laterality: N/A;   • HYSTERECTOMY         Family History   Problem Relation Age of Onset   • Cancer Father    • Cancer Sister        Social History     Tobacco Use   • Smoking status: Former Smoker     Years: 25.00     Quit date: 2009     Years since quittin.3   • Smokeless tobacco: Never Used   Vaping Use   • Vaping Use: Never used   Substance Use Topics   • Alcohol use: Yes     Comment: wine 1-2 glasses 3-4 nights a week   • Drug use: Never         ROS:  Review of Systems   Constitutional: Negative for activity change, appetite change, fatigue, fever and unexpected weight change.   HENT: Negative for congestion, postnasal drip, sinus pressure and voice change.    Respiratory: Positive  for cough. Negative for chest tightness and shortness of breath.    Cardiovascular: Negative for chest pain and leg swelling.   Gastrointestinal: Negative for abdominal pain, diarrhea and vomiting.   Musculoskeletal: Negative for back pain.   Skin: Negative for rash and wound.   Allergic/Immunologic: Positive for immunocompromised state.   Neurological: Negative for dizziness and weakness.   Hematological: Negative for adenopathy.       PE:  ED Triage Vitals   Temp Heart Rate Resp BP SpO2   08/29/21 0918 08/29/21 0918 08/29/21 0918 08/29/21 0918 08/29/21 0918   37.1 °C (98.7 °F) 65 20 130/74 97 %      Temp Source Heart Rate Source Patient Position BP Location FiO2 (%)   08/29/21 0918 -- 08/29/21 1000 -- --   Oral  Head of bed 30 degrees or higher         Physical Exam  Vitals reviewed.   Constitutional:       Appearance: Normal appearance.   HENT:      Head: Normocephalic and atraumatic.      Nose: Nose normal. No congestion or rhinorrhea.      Mouth/Throat:      Mouth: Mucous membranes are moist.      Pharynx: No oropharyngeal exudate or posterior oropharyngeal erythema.   Eyes:      General:         Right eye: No discharge.         Left eye: No discharge.      Extraocular Movements: Extraocular movements intact.      Conjunctiva/sclera: Conjunctivae normal.      Pupils: Pupils are equal, round, and reactive to light.   Neck:      Vascular: No carotid bruit.      Comments: No JVD  Cardiovascular:      Rate and Rhythm: Normal rate and regular rhythm.      Pulses: Normal pulses.      Heart sounds: Normal heart sounds. No murmur heard.   No friction rub. No gallop.    Pulmonary:      Effort: Pulmonary effort is normal. No respiratory distress.      Breath sounds: Normal breath sounds. No wheezing or rales.   Abdominal:      General: Abdomen is flat. Bowel sounds are normal. There is no distension.      Palpations: Abdomen is soft. There is no mass.      Tenderness: There is no abdominal tenderness. There is no  guarding.   Musculoskeletal:         General: No swelling.      Cervical back: Neck supple. No rigidity or tenderness.   Lymphadenopathy:      Cervical: No cervical adenopathy.   Skin:     General: Skin is warm and dry.      Coloration: Skin is not jaundiced.   Neurological:      General: No focal deficit present.      Mental Status: She is alert and oriented to person, place, and time.      Sensory: No sensory deficit.      Gait: Gait normal.   Psychiatric:         Mood and Affect: Mood normal.         ED LABS:  Labs Reviewed   CBC WITH AUTO DIFFERENTIAL - Abnormal       Result Value    WBC 7.3      RBC 4.44      Hemoglobin 14.6      Hematocrit 42.9      MCV 96.5      MCH 32.8      MCHC 34.0      RDW 12.8      Platelets 303      MPV 7.6      Neutrophils% 63      Lymphocytes% 21      Monocytes% 5      Eosinophils% 10 (*)     Basophils% 1      ANC (auto diff) 4.60      Lymphocytes Absolute 1.50      Monocytes Absolute 0.40      Eosinophils Absolute 0.70      Basophils Absolute 0.10     COVID-19 MOLECULAR (PCR) - Normal    COVID-19 PCR Negative      Narrative:     A negative result may not rule out COVID-19 in the patient, as the sensitivity of the test depends on the timing of the specimen collection, type of specimen, and the quality of the specimen. Result should be correlated with patient's history and clinical presentation.    Negative results should be treated as presumptive and, if inconsistent with clinical signs and symptoms or necessary for patient management, should be tested with an alternative molecular assay.    Performance of this SARS-CoV-2 RNA test has only been established in individuals suspected of having COVID-19 by their healthcare provider.    Testing was performed using the ID NOW COVID-19 assay ([employer]) that detects the SARS coronavirus 2 RdRp gene utilizing isothermal nucleic acid amplification.   Fact sheets for this Emergency Use Authorization (EUA) assay can be found at the following  links:  For Healthcare Providers  https://www.fda.gov/media/825277/download  For Patients  https://www.fda.gov/media/059480/download   COMPREHENSIVE METABOLIC PANEL - Normal    Sodium 140      Potassium 3.8      Chloride 104      CO2 29      Anion Gap 7      BUN 14      Creatinine 0.69      Glucose 105      Calcium 9.7      AST 16      ALT (SGPT) 11      Alkaline Phosphatase 86      Total Protein 7.5      Albumin 4.6      Total Bilirubin 0.59      eGFR 89      Corrected Calcium 9.2      Narrative:     Estimated GFR calculated using the 2009 CKD-EPI creatinine equation.   C-REACTIVE PROTEIN - Normal    CRP <1.0     SEDIMENTATION RATE, AUTOMATED - Normal    Sed Rate 5           ED IMAGES:  X-ray chest 2 views   Final Result   IMPRESSION:   Normal exam.          ED PROCEDURES:  Procedures    ED COURSE:     Patient has not occasion of acute infection in regard to her cough.  On further exam it appears more likely to be uncontrolled asthma.  Patient will return to using her Flovent, patient given a DuoNeb which helped in the emergency room.  She may utilize this in the future if she feels it is needed however at this time she will return to the Flovent and Zyrtec.  The patient will continue albuterol as needed.  Patient was given an IV dose of steroids prior to discharge.  Patient will follow up with her primary care provider in the next 3 days or the [facility] if needed, for reevaluation.  All questions were answered, patient is in agreement this plan and discharged in stable condition.     Sepsis Quality Bundle     MDM:  MDM  Number of Diagnoses or Management Options     Amount and/or Complexity of Data Reviewed  Clinical lab tests: reviewed and ordered  Tests in the radiology section of CPT®: reviewed and ordered  Review and summarize past medical records: yes  Independent visualization of images, tracings, or specimens: yes    Risk of Complications, Morbidity, and/or Mortality  Presenting problems:  moderate  Diagnostic procedures: moderate  Management options: moderate    Patient Progress  Patient progress: stable      Final diagnoses:   [J45.901] Asthma exacerbation          Connor Zaidi MD  08/29/21 2111

## 2021-08-29 NOTE — DISCHARGE INSTRUCTIONS
Recommend the patient attempt Zyrtec 10 mg daily, take her Flovent as prescribed, continue with her albuterol.  She should be seen by very pulmonology within a week, if unable to do so, she can follow-up at the [place] [facility].

## 2021-09-21 ENCOUNTER — OFFICE VISIT (OUTPATIENT)
Dept: PULMONOLOGY | Facility: CLINIC | Age: 71
End: 2021-09-21
Payer: MEDICARE

## 2021-09-21 VITALS
DIASTOLIC BLOOD PRESSURE: 60 MMHG | HEART RATE: 67 BPM | SYSTOLIC BLOOD PRESSURE: 102 MMHG | WEIGHT: 124 LBS | RESPIRATION RATE: 14 BRPM | OXYGEN SATURATION: 99 % | HEIGHT: 68 IN | BODY MASS INDEX: 18.79 KG/M2

## 2021-09-21 DIAGNOSIS — J45.909 ASTHMA, UNSPECIFIED ASTHMA SEVERITY, UNSPECIFIED WHETHER COMPLICATED, UNSPECIFIED WHETHER PERSISTENT: Primary | ICD-10-CM

## 2021-09-21 DIAGNOSIS — J45.901 ASTHMA EXACERBATION: ICD-10-CM

## 2021-09-21 DIAGNOSIS — R94.2 ABNORMAL RESULTS OF PULMONARY FUNCTION STUDIES: ICD-10-CM

## 2021-09-21 PROCEDURE — 99214 OFFICE O/P EST MOD 30 MIN: CPT | Performed by: INTERNAL MEDICINE

## 2021-09-21 PROCEDURE — G0463 HOSPITAL OUTPT CLINIC VISIT: HCPCS | Mod: PO | Performed by: INTERNAL MEDICINE

## 2021-09-21 RX ORDER — HYDROXYCHLOROQUINE SULFATE 200 MG/1
TABLET, FILM COATED ORAL DAILY
COMMUNITY

## 2021-09-21 RX ORDER — CERTOLIZUMAB PEGOL 200 MG/ML
INJECTION, SOLUTION SUBCUTANEOUS
COMMUNITY

## 2021-09-21 RX ORDER — FLUTICASONE PROPIONATE 100 UG/1
1 POWDER, METERED RESPIRATORY (INHALATION) 2 TIMES DAILY
Qty: 1 G | Refills: 11 | Status: SHIPPED | OUTPATIENT
Start: 2021-09-21 | End: 2022-11-29 | Stop reason: SDUPTHER

## 2021-09-21 ASSESSMENT — PAIN SCALES - GENERAL: PAINLEVEL: 0-NO PAIN

## 2021-09-21 NOTE — PROGRESS NOTES
Assessment      1. Asthma, unspecified asthma severity, unspecified whether complicated, unspecified whether persistent    2. Abnormal results of pulmonary function studies     3. Asthma exacerbation      (She does not have an asthma exacerbation, applied to encounter with medication renewal)      Plan   1.  Right middle lobe infiltrate.  Bronchoscopy was reassuring, microaerophilic streptococcus seen likely incidental finding.  Cough symptoms were unchanged after treatment.  Repeat radiography was similar.    2.  Chronic cough and wheezing that is improved with asthma treatment.  She is doing much better now she is back on Flovent.  It is unclear whether she started montelukast, but would be reasonable to try.  If this is helpful, could consider option again for stopping Flovent at some point.  Continue albuterol as needed.  Discussed indications doses of bronchodilators to hopefully prevent progression of symptoms with flares.    3.  She does not have a typical vasculitic rash doubt that skin findings are related to her breathing or cough.  During recent ER evaluation, ESR, CRP was normal, no eosinophilia.  I would like to check a ANCA, IgE, and Aspergillus specific IgE for reassurance.      4.  Okay to start Cimzia from a pulmonary standpoint    5.  She has received her COVID-19 vaccine    6.  Encouraged her to schedule previously ordered PFT    7. Return to clinic in 6 months when she is back from [place]      On this date of service 33 minutes of total time was spent on this encounter (from  [id number]).  This included preappointment review of chart, counseling, documentation, discussion of testing options, and shared decision-making.      ARIANA Hermosillo is a 71 y.o. year old female who returns chronic cough.  Since initial consult May 2021 underwent bronchoscopy to evaluate right middle lobe infiltrate with tree-in-bud pattern.  Protected brush specimen found microaerophilic Streptococcus that we  "treated with penicillin x10 days (starting 5/23) although did not impact her cough.    At last visit was doing fairly well and he stopped her Flovent with plans to change to montelukast.  Unclear whether she tried montelukast, but is not taking currently.  Swallow study was unremarkable and she denies any reflux symptoms after stopping PPI.    8/29/2021 was seen in the emergency department with progressively worsening cough, dyspnea.  She was given a nebulizer treatment and steroid injection with improvement in her symptoms.  At that point was restarted on Flovent.    Currently doing well with her cough and breathing.  Sputum production hemoptysis.  No fevers, chills or night sweats.  No nocturnal awakenings of breathing symptoms.  No chest pain or pleuritic symptoms.    Has a rash on her back that is itchy.  This comes and goes, but frequently associated with flares in her cough and breathing symptoms.    Currently without reflux symptoms, swallow study was unremarkable.    Prior 25-pack-year history of tobacco, quit 10-12 years ago.  No unusual exposures.    Contracted Covid December 2020 with mild illness.  She not require hospitalization or oxygenation.  Was treated with bamlanivimab                Objective     /60   Pulse 67   Resp 14   Ht 1.727 m (5' 8\")   Wt 56.2 kg (124 lb)   SpO2 99%   BMI 18.85 kg/m²       Physical Exam  Vitals reviewed.   Constitutional:       General: She is not in acute distress.     Appearance: She is normal weight.   HENT:      Nose:      Comments: No sinus tenderness  Cardiovascular:      Rate and Rhythm: Normal rate and regular rhythm.      Heart sounds: No murmur heard.     Pulmonary:      Effort: Pulmonary effort is normal.      Breath sounds: Normal breath sounds. No wheezing or rhonchi.   Musculoskeletal:      Cervical back: Neck supple.      Right lower leg: No edema.      Left lower leg: No edema.   Lymphadenopathy:      Cervical: No cervical adenopathy.   Skin:   "   General: Skin is warm and dry.      Findings: Rash ( Diffuse scattered punctate erythematous macules, less than 1 mm in size.  These sissy and are nontender. ) present.   Neurological:      Mental Status: She is alert and oriented to person, place, and time.   Psychiatric:         Behavior: Behavior normal.                  PFT  Ordered, not done        Lab            No lab exists for component: LABALBU                              The following have been reviewed and updated as appropriate in this visit:      Review of Systems  Per HPI.  Otherwise complete 10 system review unremarkable.      Past Medical History:   Diagnosis Date   • RA (rheumatoid arthritis) (CMS/HCC) (HCC)          Past Surgical History:   Procedure Laterality Date   • BRONCHOSCOPY N/A 2021    Procedure: BRONCHOSCOPY with bronchoaveolar lavage;  Surgeon: Andres Rios MD;  Location: [facility];  Service: Endoscopy;  Laterality: N/A;   • HYSTERECTOMY            Social History     Tobacco Use   • Smoking status: Former Smoker     Years: 25.00     Quit date: 2009     Years since quittin.3   • Smokeless tobacco: Never Used   Vaping Use   • Vaping Use: Never used   Substance Use Topics   • Alcohol use: Yes     Comment: wine 1-2 glasses 3-4 nights a week   • Drug use: Never          Family History   Problem Relation Age of Onset   • Cancer Father    • Cancer Sister             Current Outpatient Medications:   •  certolizumab pegoL (Cimzia) 400 mg/2 mL (200 mg/mL x 2) syringe, Inject under the skin every 28 (twenty-eight) days, Disp: , Rfl:   •  hydrOXYchloroQUINE (PlaqueniL) 200 mg tablet, Take by mouth daily, Disp: , Rfl:   •  fluticasone propionate (Flovent Diskus) 100 mcg/actuation diskus inhaler, Inhale 1 puff 2 (two) times a day Rinse mouth with water after use. Do not swallow. Stopped taking 6 weeks ago states pulmonologist told her to only take albuterol, Disp: 1 g, Rfl: 11              A voice recognition program was  used to aid in documentation of this record.  Sometimes words are not printed exactly as they were spoken.  While efforts were made to carefully edit and correct any inaccuracies, some errors may be present; please take these into context.  Please contact the provider if areas are identified.

## 2021-09-21 NOTE — PATIENT INSTRUCTIONS
Continue flovent once a day and start montelukast (singulair) nightly. If the montelukast bothers your sleep, change to taking in the morning.    With increased breathing symptoms, wheezing or cough, start albuterol 2 puffs up to 4 times a day.

## 2021-09-27 ENCOUNTER — HOSPITAL ENCOUNTER (OUTPATIENT)
Facility: HOSPITAL | Age: 71
Setting detail: INFUSION SERIES
Discharge: STILL ADMITTED OR ADMINISTRATIVE | End: 2021-09-27
Payer: MEDICARE

## 2021-09-27 VITALS
TEMPERATURE: 97.7 F | HEART RATE: 67 BPM | RESPIRATION RATE: 16 BRPM | DIASTOLIC BLOOD PRESSURE: 68 MMHG | OXYGEN SATURATION: 97 % | SYSTOLIC BLOOD PRESSURE: 135 MMHG

## 2021-09-27 DIAGNOSIS — M05.79 RHEUMATOID ARTHRITIS INVOLVING MULTIPLE SITES WITH POSITIVE RHEUMATOID FACTOR (CMS/HCC): Primary | ICD-10-CM

## 2021-09-27 PROCEDURE — 96372 THER/PROPH/DIAG INJ SC/IM: CPT

## 2021-09-27 PROCEDURE — 6360000200 HC RX 636 W HCPCS (ALT 250 FOR IP)

## 2021-09-27 RX ADMIN — CERTOLIZUMAB PEGOL 400 MG: KIT at 08:06

## 2021-10-04 ENCOUNTER — HOSPITAL ENCOUNTER (OUTPATIENT)
Dept: RESPIRATORY THERAPY | Facility: HOSPITAL | Age: 71
Discharge: HOME | End: 2021-10-04
Payer: MEDICARE

## 2021-10-04 DIAGNOSIS — R94.2 ABNORMAL RESULTS OF PULMONARY FUNCTION STUDIES: ICD-10-CM

## 2021-10-04 DIAGNOSIS — J45.909 ASTHMA, UNSPECIFIED ASTHMA SEVERITY, UNSPECIFIED WHETHER COMPLICATED, UNSPECIFIED WHETHER PERSISTENT: ICD-10-CM

## 2021-10-04 PROCEDURE — 94010 BREATHING CAPACITY TEST: CPT

## 2021-10-04 PROCEDURE — 94729 DIFFUSING CAPACITY: CPT | Mod: 26 | Performed by: INTERNAL MEDICINE

## 2021-10-04 PROCEDURE — 94010 BREATHING CAPACITY TEST: CPT | Mod: 26 | Performed by: INTERNAL MEDICINE

## 2021-11-04 ENCOUNTER — HOSPITAL ENCOUNTER (OUTPATIENT)
Facility: HOSPITAL | Age: 71
Setting detail: INFUSION SERIES
Discharge: STILL ADMITTED OR ADMINISTRATIVE | End: 2021-11-04
Payer: MEDICARE

## 2021-11-04 VITALS
DIASTOLIC BLOOD PRESSURE: 63 MMHG | SYSTOLIC BLOOD PRESSURE: 125 MMHG | HEART RATE: 70 BPM | RESPIRATION RATE: 18 BRPM | OXYGEN SATURATION: 97 %

## 2021-11-04 DIAGNOSIS — M05.79 RHEUMATOID ARTHRITIS INVOLVING MULTIPLE SITES WITH POSITIVE RHEUMATOID FACTOR (CMS/HCC): Primary | ICD-10-CM

## 2021-11-04 PROCEDURE — 96372 THER/PROPH/DIAG INJ SC/IM: CPT

## 2021-11-04 PROCEDURE — 6360000200 HC RX 636 W HCPCS (ALT 250 FOR IP)

## 2021-11-04 RX ADMIN — CERTOLIZUMAB PEGOL 400 MG: KIT at 09:07

## 2021-11-29 ENCOUNTER — HOSPITAL ENCOUNTER (OUTPATIENT)
Facility: HOSPITAL | Age: 71
Setting detail: INFUSION SERIES
Discharge: STILL ADMITTED OR ADMINISTRATIVE | End: 2021-11-29
Payer: MEDICARE

## 2021-11-29 VITALS
HEART RATE: 69 BPM | TEMPERATURE: 98.5 F | OXYGEN SATURATION: 94 % | DIASTOLIC BLOOD PRESSURE: 46 MMHG | SYSTOLIC BLOOD PRESSURE: 113 MMHG | RESPIRATION RATE: 18 BRPM

## 2021-11-29 DIAGNOSIS — M05.79 RHEUMATOID ARTHRITIS INVOLVING MULTIPLE SITES WITH POSITIVE RHEUMATOID FACTOR (CMS/HCC): Primary | ICD-10-CM

## 2021-11-29 PROCEDURE — 6360000200 HC RX 636 W HCPCS (ALT 250 FOR IP)

## 2021-11-29 PROCEDURE — 96372 THER/PROPH/DIAG INJ SC/IM: CPT

## 2021-11-29 RX ORDER — FAMOTIDINE 10 MG/ML
20 INJECTION INTRAVENOUS AS NEEDED
Status: DISCONTINUED | OUTPATIENT
Start: 2021-11-29 | End: 2021-11-30 | Stop reason: HOSPADM

## 2021-11-29 RX ORDER — DIPHENHYDRAMINE HYDROCHLORIDE 50 MG/ML
25-50 INJECTION INTRAMUSCULAR; INTRAVENOUS AS NEEDED
Status: DISCONTINUED | OUTPATIENT
Start: 2021-11-29 | End: 2021-11-30 | Stop reason: HOSPADM

## 2021-11-29 RX ORDER — SODIUM CHLORIDE 9 MG/ML
0-999 INJECTION, SOLUTION INTRAVENOUS CONTINUOUS PRN
Status: DISCONTINUED | OUTPATIENT
Start: 2021-11-29 | End: 2021-11-30 | Stop reason: HOSPADM

## 2021-11-29 RX ORDER — ACETAMINOPHEN 500 MG
500 TABLET ORAL EVERY 4 HOURS PRN
Status: DISCONTINUED | OUTPATIENT
Start: 2021-11-29 | End: 2021-11-30 | Stop reason: HOSPADM

## 2021-11-29 RX ORDER — EPINEPHRINE 1 MG/ML
0.3 INJECTION INTRAMUSCULAR; INTRAVENOUS; SUBCUTANEOUS AS NEEDED
Status: DISCONTINUED | OUTPATIENT
Start: 2021-11-29 | End: 2021-11-30 | Stop reason: HOSPADM

## 2021-11-29 RX ORDER — ALBUTEROL SULFATE 0.83 MG/ML
2.5 SOLUTION RESPIRATORY (INHALATION) AS NEEDED
Status: DISCONTINUED | OUTPATIENT
Start: 2021-11-29 | End: 2021-11-30 | Stop reason: HOSPADM

## 2021-11-29 RX ADMIN — CERTOLIZUMAB PEGOL 400 MG: KIT at 08:43

## 2021-12-14 ENCOUNTER — TELEPHONE (OUTPATIENT)
Dept: PULMONOLOGY | Facility: CLINIC | Age: 71
End: 2021-12-14
Payer: MEDICARE

## 2021-12-14 NOTE — TELEPHONE ENCOUNTER
Pt calls states she is having a lot more shortness of breath and cough x 5 days.She has been using her flovent, albuterol and montelukast. She is wanting to know if she could get a nebulizer prescription for meds and machine or prednisone taper to help her get through this? She does use [facility] in [facility].

## 2022-04-13 ENCOUNTER — HOSPITAL ENCOUNTER (OUTPATIENT)
Facility: HOSPITAL | Age: 72
Discharge: HOME | End: 2022-04-13
Payer: MEDICARE

## 2022-04-13 VITALS
HEART RATE: 64 BPM | OXYGEN SATURATION: 96 % | TEMPERATURE: 98 F | SYSTOLIC BLOOD PRESSURE: 95 MMHG | RESPIRATION RATE: 14 BRPM | DIASTOLIC BLOOD PRESSURE: 51 MMHG

## 2022-04-13 DIAGNOSIS — M05.79 RHEUMATOID ARTHRITIS INVOLVING MULTIPLE SITES WITH POSITIVE RHEUMATOID FACTOR (CMS/HCC): Primary | ICD-10-CM

## 2022-04-13 PROCEDURE — 6360000200 HC RX 636 W HCPCS (ALT 250 FOR IP): Performed by: INTERNAL MEDICINE

## 2022-04-13 PROCEDURE — 96372 THER/PROPH/DIAG INJ SC/IM: CPT

## 2022-04-13 RX ORDER — DIPHENHYDRAMINE HYDROCHLORIDE 50 MG/ML
25-50 INJECTION INTRAMUSCULAR; INTRAVENOUS AS NEEDED
Status: DISCONTINUED | OUTPATIENT
Start: 2022-04-13 | End: 2022-04-14 | Stop reason: HOSPADM

## 2022-04-13 RX ORDER — FAMOTIDINE 10 MG/ML
20 INJECTION INTRAVENOUS AS NEEDED
Status: DISCONTINUED | OUTPATIENT
Start: 2022-04-13 | End: 2022-04-14 | Stop reason: HOSPADM

## 2022-04-13 RX ORDER — SODIUM CHLORIDE 9 MG/ML
0-999 INJECTION, SOLUTION INTRAVENOUS CONTINUOUS PRN
Status: DISCONTINUED | OUTPATIENT
Start: 2022-04-13 | End: 2022-04-14 | Stop reason: HOSPADM

## 2022-04-13 RX ORDER — ALBUTEROL SULFATE 0.83 MG/ML
2.5 SOLUTION RESPIRATORY (INHALATION) AS NEEDED
Status: DISCONTINUED | OUTPATIENT
Start: 2022-04-13 | End: 2022-04-14 | Stop reason: HOSPADM

## 2022-04-13 RX ORDER — EPINEPHRINE 1 MG/ML
0.3 INJECTION INTRAMUSCULAR; INTRAVENOUS; SUBCUTANEOUS AS NEEDED
Status: DISCONTINUED | OUTPATIENT
Start: 2022-04-13 | End: 2022-04-14 | Stop reason: HOSPADM

## 2022-04-13 RX ORDER — ACETAMINOPHEN 500 MG
500 TABLET ORAL EVERY 4 HOURS PRN
Status: DISCONTINUED | OUTPATIENT
Start: 2022-04-13 | End: 2022-04-14 | Stop reason: HOSPADM

## 2022-04-13 RX ADMIN — CERTOLIZUMAB PEGOL 400 MG: KIT at 09:07

## 2022-05-09 ENCOUNTER — HOSPITAL ENCOUNTER (OUTPATIENT)
Facility: HOSPITAL | Age: 72
Discharge: HOME | End: 2022-05-09
Payer: MEDICARE

## 2022-05-09 VITALS
RESPIRATION RATE: 18 BRPM | OXYGEN SATURATION: 97 % | SYSTOLIC BLOOD PRESSURE: 118 MMHG | DIASTOLIC BLOOD PRESSURE: 56 MMHG | TEMPERATURE: 97.4 F | HEART RATE: 59 BPM

## 2022-05-09 DIAGNOSIS — M05.79 RHEUMATOID ARTHRITIS INVOLVING MULTIPLE SITES WITH POSITIVE RHEUMATOID FACTOR (CMS/HCC): Primary | ICD-10-CM

## 2022-05-09 PROCEDURE — 6360000200 HC RX 636 W HCPCS (ALT 250 FOR IP): Performed by: INTERNAL MEDICINE

## 2022-05-09 PROCEDURE — 96372 THER/PROPH/DIAG INJ SC/IM: CPT

## 2022-05-09 RX ORDER — EPINEPHRINE 1 MG/ML
0.3 INJECTION INTRAMUSCULAR; INTRAVENOUS; SUBCUTANEOUS AS NEEDED
Status: DISCONTINUED | OUTPATIENT
Start: 2022-05-09 | End: 2022-05-10 | Stop reason: HOSPADM

## 2022-05-09 RX ORDER — ALBUTEROL SULFATE 0.83 MG/ML
2.5 SOLUTION RESPIRATORY (INHALATION) AS NEEDED
Status: DISCONTINUED | OUTPATIENT
Start: 2022-05-09 | End: 2022-05-10 | Stop reason: HOSPADM

## 2022-05-09 RX ORDER — FAMOTIDINE 10 MG/ML
20 INJECTION INTRAVENOUS AS NEEDED
Status: DISCONTINUED | OUTPATIENT
Start: 2022-05-09 | End: 2022-05-10 | Stop reason: HOSPADM

## 2022-05-09 RX ORDER — SODIUM CHLORIDE 9 MG/ML
0-999 INJECTION, SOLUTION INTRAVENOUS CONTINUOUS PRN
Status: DISCONTINUED | OUTPATIENT
Start: 2022-05-09 | End: 2022-05-10 | Stop reason: HOSPADM

## 2022-05-09 RX ORDER — ACETAMINOPHEN 500 MG
500 TABLET ORAL EVERY 4 HOURS PRN
Status: DISCONTINUED | OUTPATIENT
Start: 2022-05-09 | End: 2022-05-10 | Stop reason: HOSPADM

## 2022-05-09 RX ORDER — DIPHENHYDRAMINE HYDROCHLORIDE 50 MG/ML
25-50 INJECTION INTRAMUSCULAR; INTRAVENOUS AS NEEDED
Status: DISCONTINUED | OUTPATIENT
Start: 2022-05-09 | End: 2022-05-10 | Stop reason: HOSPADM

## 2022-05-09 RX ADMIN — CERTOLIZUMAB PEGOL 400 MG: KIT at 08:35

## 2022-06-09 ENCOUNTER — HOSPITAL ENCOUNTER (OUTPATIENT)
Facility: HOSPITAL | Age: 72
Discharge: HOME | End: 2022-06-09
Payer: MEDICARE

## 2022-06-09 VITALS
HEART RATE: 57 BPM | SYSTOLIC BLOOD PRESSURE: 109 MMHG | OXYGEN SATURATION: 96 % | RESPIRATION RATE: 18 BRPM | TEMPERATURE: 97.7 F | DIASTOLIC BLOOD PRESSURE: 57 MMHG

## 2022-06-09 DIAGNOSIS — M05.79 RHEUMATOID ARTHRITIS INVOLVING MULTIPLE SITES WITH POSITIVE RHEUMATOID FACTOR (CMS/HCC): Primary | ICD-10-CM

## 2022-06-09 PROCEDURE — 6360000200 HC RX 636 W HCPCS (ALT 250 FOR IP)

## 2022-06-09 PROCEDURE — 96372 THER/PROPH/DIAG INJ SC/IM: CPT

## 2022-06-09 RX ORDER — ALBUTEROL SULFATE 0.83 MG/ML
2.5 SOLUTION RESPIRATORY (INHALATION) AS NEEDED
Status: DISCONTINUED | OUTPATIENT
Start: 2022-06-09 | End: 2022-06-10 | Stop reason: HOSPADM

## 2022-06-09 RX ORDER — FAMOTIDINE 10 MG/ML
20 INJECTION INTRAVENOUS AS NEEDED
Status: DISCONTINUED | OUTPATIENT
Start: 2022-06-09 | End: 2022-06-10 | Stop reason: HOSPADM

## 2022-06-09 RX ORDER — ACETAMINOPHEN 500 MG
500 TABLET ORAL EVERY 4 HOURS PRN
Status: DISCONTINUED | OUTPATIENT
Start: 2022-06-09 | End: 2022-06-10 | Stop reason: HOSPADM

## 2022-06-09 RX ORDER — EPINEPHRINE 1 MG/ML
0.3 INJECTION INTRAMUSCULAR; INTRAVENOUS; SUBCUTANEOUS AS NEEDED
Status: DISCONTINUED | OUTPATIENT
Start: 2022-06-09 | End: 2022-06-10 | Stop reason: HOSPADM

## 2022-06-09 RX ORDER — DIPHENHYDRAMINE HYDROCHLORIDE 50 MG/ML
25-50 INJECTION INTRAMUSCULAR; INTRAVENOUS AS NEEDED
Status: DISCONTINUED | OUTPATIENT
Start: 2022-06-09 | End: 2022-06-10 | Stop reason: HOSPADM

## 2022-06-09 RX ORDER — SODIUM CHLORIDE 9 MG/ML
0-999 INJECTION, SOLUTION INTRAVENOUS CONTINUOUS PRN
Status: DISCONTINUED | OUTPATIENT
Start: 2022-06-09 | End: 2022-06-10 | Stop reason: HOSPADM

## 2022-06-09 RX ADMIN — CERTOLIZUMAB PEGOL 400 MG: KIT at 08:37

## 2022-07-07 RX ORDER — ACETAMINOPHEN 500 MG
500 TABLET ORAL EVERY 4 HOURS PRN
Status: CANCELLED | OUTPATIENT
Start: 2022-09-05

## 2022-07-07 RX ORDER — DIPHENHYDRAMINE HYDROCHLORIDE 50 MG/ML
25-50 INJECTION INTRAMUSCULAR; INTRAVENOUS AS NEEDED
Status: CANCELLED | OUTPATIENT
Start: 2022-10-31

## 2022-07-07 RX ORDER — FAMOTIDINE 10 MG/ML
20 INJECTION INTRAVENOUS AS NEEDED
Status: CANCELLED | OUTPATIENT
Start: 2022-08-08

## 2022-07-07 RX ORDER — ALBUTEROL SULFATE 0.83 MG/ML
2.5 SOLUTION RESPIRATORY (INHALATION) AS NEEDED
Status: CANCELLED | OUTPATIENT
Start: 2022-10-31

## 2022-07-07 RX ORDER — EPINEPHRINE 1 MG/ML
0.3 INJECTION INTRAMUSCULAR; INTRAVENOUS; SUBCUTANEOUS AS NEEDED
Status: CANCELLED | OUTPATIENT
Start: 2022-10-31

## 2022-07-07 RX ORDER — ACETAMINOPHEN 500 MG
500 TABLET ORAL EVERY 4 HOURS PRN
Status: CANCELLED | OUTPATIENT
Start: 2022-10-03

## 2022-07-07 RX ORDER — EPINEPHRINE 1 MG/ML
0.3 INJECTION INTRAMUSCULAR; INTRAVENOUS; SUBCUTANEOUS AS NEEDED
Status: CANCELLED | OUTPATIENT
Start: 2022-09-05

## 2022-07-07 RX ORDER — ALBUTEROL SULFATE 0.83 MG/ML
2.5 SOLUTION RESPIRATORY (INHALATION) AS NEEDED
Status: CANCELLED | OUTPATIENT
Start: 2023-05-29

## 2022-07-07 RX ORDER — SODIUM CHLORIDE 9 MG/ML
0-999 INJECTION, SOLUTION INTRAVENOUS CONTINUOUS PRN
Status: CANCELLED | OUTPATIENT
Start: 2022-08-08 | End: 2022-08-02

## 2022-07-07 RX ORDER — ALBUTEROL SULFATE 0.83 MG/ML
2.5 SOLUTION RESPIRATORY (INHALATION) AS NEEDED
Status: CANCELLED | OUTPATIENT
Start: 2022-08-08

## 2022-07-07 RX ORDER — FAMOTIDINE 10 MG/ML
20 INJECTION INTRAVENOUS AS NEEDED
Status: CANCELLED | OUTPATIENT
Start: 2022-10-31

## 2022-07-07 RX ORDER — ACETAMINOPHEN 500 MG
500 TABLET ORAL EVERY 4 HOURS PRN
Status: CANCELLED | OUTPATIENT
Start: 2023-05-29

## 2022-07-07 RX ORDER — FAMOTIDINE 10 MG/ML
20 INJECTION INTRAVENOUS AS NEEDED
Status: CANCELLED | OUTPATIENT
Start: 2023-05-29

## 2022-07-07 RX ORDER — DIPHENHYDRAMINE HYDROCHLORIDE 50 MG/ML
25-50 INJECTION INTRAMUSCULAR; INTRAVENOUS AS NEEDED
Status: CANCELLED | OUTPATIENT
Start: 2022-09-05

## 2022-07-07 RX ORDER — DIPHENHYDRAMINE HYDROCHLORIDE 50 MG/ML
25-50 INJECTION INTRAMUSCULAR; INTRAVENOUS AS NEEDED
Status: CANCELLED | OUTPATIENT
Start: 2022-10-03

## 2022-07-07 RX ORDER — DIPHENHYDRAMINE HYDROCHLORIDE 50 MG/ML
25-50 INJECTION INTRAMUSCULAR; INTRAVENOUS AS NEEDED
Status: CANCELLED | OUTPATIENT
Start: 2022-08-08

## 2022-07-07 RX ORDER — ACETAMINOPHEN 500 MG
500 TABLET ORAL EVERY 4 HOURS PRN
Status: CANCELLED | OUTPATIENT
Start: 2022-10-31

## 2022-07-07 RX ORDER — ALBUTEROL SULFATE 0.83 MG/ML
2.5 SOLUTION RESPIRATORY (INHALATION) AS NEEDED
Status: CANCELLED | OUTPATIENT
Start: 2022-09-05

## 2022-07-07 RX ORDER — EPINEPHRINE 1 MG/ML
0.3 INJECTION INTRAMUSCULAR; INTRAVENOUS; SUBCUTANEOUS AS NEEDED
Status: CANCELLED | OUTPATIENT
Start: 2023-05-29

## 2022-07-07 RX ORDER — EPINEPHRINE 1 MG/ML
0.3 INJECTION INTRAMUSCULAR; INTRAVENOUS; SUBCUTANEOUS AS NEEDED
Status: CANCELLED | OUTPATIENT
Start: 2022-08-08

## 2022-07-07 RX ORDER — SODIUM CHLORIDE 9 MG/ML
0-999 INJECTION, SOLUTION INTRAVENOUS CONTINUOUS PRN
Status: CANCELLED | OUTPATIENT
Start: 2022-09-05 | End: 2022-08-30

## 2022-07-07 RX ORDER — FAMOTIDINE 10 MG/ML
20 INJECTION INTRAVENOUS AS NEEDED
Status: CANCELLED | OUTPATIENT
Start: 2022-10-03

## 2022-07-07 RX ORDER — FAMOTIDINE 10 MG/ML
20 INJECTION INTRAVENOUS AS NEEDED
Status: CANCELLED | OUTPATIENT
Start: 2022-09-05

## 2022-07-07 RX ORDER — SODIUM CHLORIDE 9 MG/ML
0-999 INJECTION, SOLUTION INTRAVENOUS CONTINUOUS PRN
Status: CANCELLED | OUTPATIENT
Start: 2023-05-29 | End: 2022-11-22

## 2022-07-07 RX ORDER — ACETAMINOPHEN 500 MG
500 TABLET ORAL EVERY 4 HOURS PRN
Status: CANCELLED | OUTPATIENT
Start: 2022-08-08

## 2022-07-07 RX ORDER — DIPHENHYDRAMINE HYDROCHLORIDE 50 MG/ML
25-50 INJECTION INTRAMUSCULAR; INTRAVENOUS AS NEEDED
Status: CANCELLED | OUTPATIENT
Start: 2023-05-29

## 2022-07-07 RX ORDER — EPINEPHRINE 1 MG/ML
0.3 INJECTION INTRAMUSCULAR; INTRAVENOUS; SUBCUTANEOUS AS NEEDED
Status: CANCELLED | OUTPATIENT
Start: 2022-10-03

## 2022-07-07 RX ORDER — ALBUTEROL SULFATE 0.83 MG/ML
2.5 SOLUTION RESPIRATORY (INHALATION) AS NEEDED
Status: CANCELLED | OUTPATIENT
Start: 2022-10-03

## 2022-07-07 RX ORDER — SODIUM CHLORIDE 9 MG/ML
0-999 INJECTION, SOLUTION INTRAVENOUS CONTINUOUS PRN
Status: CANCELLED | OUTPATIENT
Start: 2022-10-31 | End: 2022-10-25

## 2022-07-07 RX ORDER — SODIUM CHLORIDE 9 MG/ML
0-999 INJECTION, SOLUTION INTRAVENOUS CONTINUOUS PRN
Status: CANCELLED | OUTPATIENT
Start: 2022-10-03 | End: 2022-09-27

## 2022-07-11 ENCOUNTER — HOSPITAL ENCOUNTER (OUTPATIENT)
Facility: HOSPITAL | Age: 72
Discharge: HOME | End: 2022-07-11
Payer: MEDICARE

## 2022-07-11 ENCOUNTER — OFFICE VISIT (OUTPATIENT)
Dept: URGENT CARE | Facility: CLINIC | Age: 72
End: 2022-07-11
Payer: MEDICARE

## 2022-07-11 VITALS
DIASTOLIC BLOOD PRESSURE: 72 MMHG | RESPIRATION RATE: 14 BRPM | OXYGEN SATURATION: 97 % | HEART RATE: 56 BPM | SYSTOLIC BLOOD PRESSURE: 126 MMHG

## 2022-07-11 VITALS
RESPIRATION RATE: 12 BRPM | HEART RATE: 65 BPM | OXYGEN SATURATION: 96 % | SYSTOLIC BLOOD PRESSURE: 125 MMHG | DIASTOLIC BLOOD PRESSURE: 63 MMHG | TEMPERATURE: 97.7 F

## 2022-07-11 DIAGNOSIS — R05.3 CHRONIC COUGH: ICD-10-CM

## 2022-07-11 DIAGNOSIS — H91.92 ACUTE HEARING LOSS OF LEFT EAR: Primary | ICD-10-CM

## 2022-07-11 DIAGNOSIS — H91.92 HEARING LOSS OF LEFT EAR, UNSPECIFIED HEARING LOSS TYPE: ICD-10-CM

## 2022-07-11 DIAGNOSIS — M05.79 RHEUMATOID ARTHRITIS INVOLVING MULTIPLE SITES WITH POSITIVE RHEUMATOID FACTOR (CMS/HCC): Primary | ICD-10-CM

## 2022-07-11 PROCEDURE — 6360000200 HC RX 636 W HCPCS (ALT 250 FOR IP): Performed by: INTERNAL MEDICINE

## 2022-07-11 PROCEDURE — 96372 THER/PROPH/DIAG INJ SC/IM: CPT

## 2022-07-11 PROCEDURE — G0463 HOSPITAL OUTPT CLINIC VISIT: HCPCS

## 2022-07-11 PROCEDURE — 99213 OFFICE O/P EST LOW 20 MIN: CPT | Mod: GF | Performed by: NURSE PRACTITIONER

## 2022-07-11 PROCEDURE — 99202 OFFICE O/P NEW SF 15 MIN: CPT | Mod: GF | Performed by: NURSE PRACTITIONER

## 2022-07-11 RX ORDER — DIPHENHYDRAMINE HYDROCHLORIDE 50 MG/ML
25-50 INJECTION INTRAMUSCULAR; INTRAVENOUS AS NEEDED
Status: DISCONTINUED | OUTPATIENT
Start: 2022-07-11 | End: 2022-07-12 | Stop reason: HOSPADM

## 2022-07-11 RX ORDER — ACETAMINOPHEN 500 MG
500 TABLET ORAL EVERY 4 HOURS PRN
Status: DISCONTINUED | OUTPATIENT
Start: 2022-07-11 | End: 2022-07-12 | Stop reason: HOSPADM

## 2022-07-11 RX ORDER — ALBUTEROL SULFATE 0.83 MG/ML
2.5 SOLUTION RESPIRATORY (INHALATION) AS NEEDED
Status: DISCONTINUED | OUTPATIENT
Start: 2022-07-11 | End: 2022-07-12 | Stop reason: HOSPADM

## 2022-07-11 RX ORDER — FAMOTIDINE 10 MG/ML
20 INJECTION INTRAVENOUS AS NEEDED
Status: DISCONTINUED | OUTPATIENT
Start: 2022-07-11 | End: 2022-07-12 | Stop reason: HOSPADM

## 2022-07-11 RX ORDER — MONTELUKAST SODIUM 10 MG/1
TABLET ORAL
Qty: 90 TABLET | Refills: 0 | Status: SHIPPED | OUTPATIENT
Start: 2022-07-11 | End: 2022-11-14 | Stop reason: SDUPTHER

## 2022-07-11 RX ORDER — SODIUM CHLORIDE 9 MG/ML
0-999 INJECTION, SOLUTION INTRAVENOUS CONTINUOUS PRN
Status: DISCONTINUED | OUTPATIENT
Start: 2022-07-11 | End: 2022-07-12 | Stop reason: HOSPADM

## 2022-07-11 RX ORDER — MONTELUKAST SODIUM 10 MG/1
TABLET ORAL
Qty: 30 TABLET | Refills: 0 | Status: SHIPPED | OUTPATIENT
Start: 2022-07-11 | End: 2022-07-11

## 2022-07-11 RX ORDER — EPINEPHRINE 1 MG/ML
0.3 INJECTION INTRAMUSCULAR; INTRAVENOUS; SUBCUTANEOUS AS NEEDED
Status: DISCONTINUED | OUTPATIENT
Start: 2022-07-11 | End: 2022-07-12 | Stop reason: HOSPADM

## 2022-07-11 RX ADMIN — CERTOLIZUMAB PEGOL 400 MG: KIT at 09:32

## 2022-07-11 ASSESSMENT — ENCOUNTER SYMPTOMS: CONSTITUTIONAL NEGATIVE: 1

## 2022-07-11 NOTE — PROGRESS NOTES
Subjective      Valorie Hermosillo is a 72 y.o. female who presents for difficulty hearing left ear.    HPI    Mrs. Hermosillo is a 9-year-old female who presents with decreased hearing in her left ear x3 weeks.  She denies any trauma.  She denies placing anything in her ear.  She denies any new hearing devices.  She denies any lightheadedness or dizziness.  She denies any recent fever, chills, or any infections.    The following have been reviewed and updated as appropriate in this visit:    Past Medical History:   Diagnosis Date   • RA (rheumatoid arthritis) (CMS/HCC) ([facility])      Past Surgical History:   Procedure Laterality Date   • BRONCHOSCOPY N/A 5/20/2021    Procedure: BRONCHOSCOPY with bronchoaveolar lavage;  Surgeon: Andres Rios MD;  Location: [facility];  Service: Endoscopy;  Laterality: N/A;   • HYSTERECTOMY                   Review of Systems   Constitutional: Negative.    HENT:        Left ear decreased hearing.       Objective   /72   Pulse 56   Resp 14   SpO2 97%     Physical Exam  Constitutional:       Appearance: Normal appearance.   HENT:      Head: Normocephalic and atraumatic.      Right Ear: Tympanic membrane, ear canal and external ear normal.      Left Ear: Ear canal and external ear normal.      Ears:      Comments: Black sediment on TM. Possible bug.      Nose: Nose normal.      Mouth/Throat:      Mouth: Mucous membranes are moist.      Pharynx: Oropharynx is clear.   Neurological:      Mental Status: She is alert.         Assessment/Plan   Diagnoses and all orders for this visit:    Acute hearing loss of left ear  -     MR brain with and without contrast; Future  -     Creatinine, serum Blood, Venous; Future  -     Ambulatory referral to ENT; Future    Hearing loss of left ear, unspecified hearing loss type     Valorie is a 72-year-old female who presents to the clinic today with a 3-week history of decreased hearing in her left ear.  Upon initial examination, there appeared to  be black possible bug next to the tympanic membrane.  Ear was irrigated.  Black spot was removed.  Patient continued to not be able to hear.  TM slightly erythematous after irrigation.  No bulging or drainage noted.   Discussed case with Dr. Heena Davenport.  Patient examined by Dr. Davenport and an MRI of brain was ordered.  Patient referred to ENT, Dr. Goldberg.  Creatinine ordered by Dr. Davenport.  Patient agreed to plan and to follow-up with ENT.      Alex Sanchez, CNP

## 2022-07-11 NOTE — NURSING NOTE
Pt tolerated SQ injections well. Observed Valorie for 20 min after injections and she denies any changes in how she feels. Pt taken to front entrance where she plans to check into walk-in for another issue.

## 2022-07-15 ENCOUNTER — TELEPHONE (OUTPATIENT)
Dept: PULMONOLOGY | Facility: CLINIC | Age: 72
End: 2022-07-15
Payer: MEDICARE

## 2022-07-15 NOTE — TELEPHONE ENCOUNTER
----- Message from Marcie James sent at 7/11/2022 10:02 AM MDT -----  Regarding: RE: fuv  I did call Valorie and she stated she is doing really well and declined to schedule at this time. She will call back if feels an appointment is necessary. Can you please cancel PFT order for now? Thanks, Marcie  ----- Message -----  From: Deidre Escalona LPN  Sent: 7/11/2022   9:41 AM MDT  To: , #  Subject: fuv                                              Please schedule established with Gabriel with [facility]. Pt is overdue for this fuv

## 2022-08-07 RX ORDER — SUCCINYLCHOLINE CHLORIDE 20 MG/ML
INJECTION INTRAMUSCULAR; INTRAVENOUS
Status: DISCONTINUED
Start: 2022-08-07 | End: 2022-08-09 | Stop reason: HOSPADM

## 2022-08-07 RX ORDER — PROPOFOL 10 MG/ML
INJECTION, EMULSION INTRAVENOUS
Status: DISCONTINUED
Start: 2022-08-07 | End: 2022-08-07 | Stop reason: WASHOUT

## 2022-08-07 RX ORDER — WATER FOR INJECTION,STERILE
VIAL (ML) INJECTION
Status: DISCONTINUED
Start: 2022-08-07 | End: 2022-08-07 | Stop reason: WASHOUT

## 2022-08-07 RX ORDER — VECURONIUM BROMIDE 1 MG/ML
INJECTION, POWDER, LYOPHILIZED, FOR SOLUTION INTRAVENOUS
Status: DISCONTINUED
Start: 2022-08-07 | End: 2022-08-07 | Stop reason: WASHOUT

## 2022-08-08 ENCOUNTER — HOSPITAL ENCOUNTER (OUTPATIENT)
Facility: HOSPITAL | Age: 72
Setting detail: INFUSION SERIES
Discharge: STILL ADMITTED OR ADMINISTRATIVE | End: 2022-08-08
Payer: MEDICARE

## 2022-08-08 VITALS
OXYGEN SATURATION: 100 % | RESPIRATION RATE: 16 BRPM | SYSTOLIC BLOOD PRESSURE: 141 MMHG | HEART RATE: 68 BPM | TEMPERATURE: 97.7 F | DIASTOLIC BLOOD PRESSURE: 65 MMHG

## 2022-08-08 DIAGNOSIS — M05.79 RHEUMATOID ARTHRITIS INVOLVING MULTIPLE SITES WITH POSITIVE RHEUMATOID FACTOR (CMS/HCC): Primary | ICD-10-CM

## 2022-08-08 PROCEDURE — 96372 THER/PROPH/DIAG INJ SC/IM: CPT

## 2022-08-08 PROCEDURE — 6360000200 HC RX 636 W HCPCS (ALT 250 FOR IP): Performed by: INTERNAL MEDICINE

## 2022-08-08 RX ADMIN — CERTOLIZUMAB PEGOL 400 MG: KIT at 08:02

## 2022-09-05 ENCOUNTER — HOSPITAL ENCOUNTER (OUTPATIENT)
Facility: HOSPITAL | Age: 72
Setting detail: INFUSION SERIES
Discharge: STILL ADMITTED OR ADMINISTRATIVE | End: 2022-09-05
Payer: MEDICARE

## 2022-09-05 VITALS
OXYGEN SATURATION: 96 % | SYSTOLIC BLOOD PRESSURE: 122 MMHG | RESPIRATION RATE: 16 BRPM | DIASTOLIC BLOOD PRESSURE: 52 MMHG | TEMPERATURE: 97.7 F | HEART RATE: 85 BPM

## 2022-09-05 DIAGNOSIS — M05.79 RHEUMATOID ARTHRITIS INVOLVING MULTIPLE SITES WITH POSITIVE RHEUMATOID FACTOR (CMS/HCC): Primary | ICD-10-CM

## 2022-09-05 PROCEDURE — 96372 THER/PROPH/DIAG INJ SC/IM: CPT

## 2022-09-05 PROCEDURE — 6360000200 HC RX 636 W HCPCS (ALT 250 FOR IP)

## 2022-09-05 RX ADMIN — CERTOLIZUMAB PEGOL 400 MG: KIT at 08:45

## 2022-10-03 ENCOUNTER — HOSPITAL ENCOUNTER (OUTPATIENT)
Facility: HOSPITAL | Age: 72
Setting detail: INFUSION SERIES
Discharge: STILL ADMITTED OR ADMINISTRATIVE | End: 2022-10-03
Payer: MEDICARE

## 2022-10-03 VITALS
DIASTOLIC BLOOD PRESSURE: 54 MMHG | SYSTOLIC BLOOD PRESSURE: 121 MMHG | OXYGEN SATURATION: 96 % | TEMPERATURE: 97.7 F | HEART RATE: 63 BPM

## 2022-10-03 DIAGNOSIS — M05.79 RHEUMATOID ARTHRITIS INVOLVING MULTIPLE SITES WITH POSITIVE RHEUMATOID FACTOR (CMS/HCC): Primary | ICD-10-CM

## 2022-10-03 PROCEDURE — 96372 THER/PROPH/DIAG INJ SC/IM: CPT

## 2022-10-03 PROCEDURE — 6360000200 HC RX 636 W HCPCS (ALT 250 FOR IP): Performed by: INTERNAL MEDICINE

## 2022-10-03 RX ADMIN — CERTOLIZUMAB PEGOL 400 MG: KIT at 08:28

## 2022-10-27 DIAGNOSIS — R05.3 CHRONIC COUGH: ICD-10-CM

## 2022-10-28 RX ORDER — MONTELUKAST SODIUM 10 MG/1
TABLET ORAL
Qty: 90 TABLET | Refills: 0 | OUTPATIENT
Start: 2022-10-28

## 2022-10-31 ENCOUNTER — HOSPITAL ENCOUNTER (OUTPATIENT)
Facility: HOSPITAL | Age: 72
Setting detail: INFUSION SERIES
Discharge: STILL ADMITTED OR ADMINISTRATIVE | End: 2022-10-31
Payer: MEDICARE

## 2022-10-31 VITALS
DIASTOLIC BLOOD PRESSURE: 48 MMHG | TEMPERATURE: 97.8 F | SYSTOLIC BLOOD PRESSURE: 115 MMHG | RESPIRATION RATE: 16 BRPM | OXYGEN SATURATION: 96 % | HEART RATE: 59 BPM

## 2022-10-31 DIAGNOSIS — M05.79 RHEUMATOID ARTHRITIS INVOLVING MULTIPLE SITES WITH POSITIVE RHEUMATOID FACTOR (CMS/HCC): Primary | ICD-10-CM

## 2022-10-31 PROCEDURE — 6360000200 HC RX 636 W HCPCS (ALT 250 FOR IP): Performed by: INTERNAL MEDICINE

## 2022-10-31 PROCEDURE — 96372 THER/PROPH/DIAG INJ SC/IM: CPT

## 2022-10-31 RX ADMIN — CERTOLIZUMAB PEGOL 400 MG: KIT at 08:46

## 2022-11-11 ENCOUNTER — TELEPHONE (OUTPATIENT)
Dept: PULMONOLOGY | Facility: CLINIC | Age: 72
End: 2022-11-11
Payer: MEDICARE

## 2022-11-11 NOTE — TELEPHONE ENCOUNTER
Pt calls asking for a refill of her montelukast. Nurse notified pt will need to be seen as it has been over a year. Pt states there is no reason for her to return as she is doing well. She will reach out to her provider for a refill.

## 2022-11-14 ENCOUNTER — OFFICE VISIT (OUTPATIENT)
Dept: URGENT CARE | Facility: CLINIC | Age: 72
End: 2022-11-14
Payer: MEDICARE

## 2022-11-14 ENCOUNTER — HOSPITAL ENCOUNTER (OUTPATIENT)
Dept: RADIOLOGY | Facility: HOSPITAL | Age: 72
Discharge: HOME | End: 2022-11-14
Payer: MEDICARE

## 2022-11-14 VITALS
HEART RATE: 64 BPM | HEIGHT: 68 IN | SYSTOLIC BLOOD PRESSURE: 105 MMHG | DIASTOLIC BLOOD PRESSURE: 61 MMHG | BODY MASS INDEX: 18.19 KG/M2 | WEIGHT: 120 LBS | OXYGEN SATURATION: 95 %

## 2022-11-14 DIAGNOSIS — R51.9 NONINTRACTABLE HEADACHE, UNSPECIFIED CHRONICITY PATTERN, UNSPECIFIED HEADACHE TYPE: ICD-10-CM

## 2022-11-14 DIAGNOSIS — R05.9 COUGH, UNSPECIFIED TYPE: Primary | ICD-10-CM

## 2022-11-14 DIAGNOSIS — Z78.0 POST-MENOPAUSAL: ICD-10-CM

## 2022-11-14 DIAGNOSIS — R50.9 FEVER, UNSPECIFIED FEVER CAUSE: ICD-10-CM

## 2022-11-14 DIAGNOSIS — R05.3 CHRONIC COUGH: ICD-10-CM

## 2022-11-14 LAB
ALBUMIN SERPL-MCNC: 4.4 G/DL (ref 3.5–5.3)
ALP SERPL-CCNC: 79 U/L (ref 55–142)
ALT SERPL-CCNC: 13 U/L (ref 7–52)
ANION GAP SERPL CALC-SCNC: 9 MMOL/L (ref 3–11)
AST SERPL-CCNC: 14 U/L
BACTERIA #/AREA URNS HPF: NORMAL /HPF
BASOPHILS # BLD AUTO: 0.1 10*3/UL
BASOPHILS NFR BLD AUTO: 0.9 % (ref 0–2)
BILIRUB SERPL-MCNC: 0.81 MG/DL (ref 0.2–1.4)
BILIRUB UR QL: NEGATIVE
BUN SERPL-MCNC: 11 MG/DL (ref 7–25)
CALCIUM ALBUM COR SERPL-MCNC: 9.1 MG/DL (ref 8.6–10.3)
CALCIUM SERPL-MCNC: 9.4 MG/DL (ref 8.6–10.3)
CHLORIDE SERPL-SCNC: 102 MMOL/L (ref 98–107)
CLARITY UR: CLEAR
CO2 SERPL-SCNC: 29 MMOL/L (ref 21–32)
COLOR UR: YELLOW
CREAT SERPL-MCNC: 0.67 MG/DL (ref 0.6–1.1)
CRP SERPL-MCNC: 131.8 MG/L
EOSINOPHIL # BLD AUTO: 0.2 10*3/UL
EOSINOPHIL NFR BLD AUTO: 1.8 % (ref 0–3)
ERYTHROCYTE [DISTWIDTH] IN BLOOD BY AUTOMATED COUNT: 12.4 % (ref 11.5–14)
ERYTHROCYTE [SEDIMENTATION RATE] IN BLOOD: 28 MM/HR
GFR SERPL CREATININE-BSD FRML MDRD: 94 ML/MIN/1.73M*2
GLUCOSE SERPL-MCNC: 76 MG/DL (ref 70–105)
GLUCOSE UR QL: NEGATIVE MG/DL
HCT VFR BLD AUTO: 41.7 % (ref 34–45)
HGB BLD-MCNC: 14.1 G/DL (ref 11.5–15.5)
HGB UR QL: NEGATIVE
KETONES UR-MCNC: NEGATIVE MG/DL
LEUKOCYTE ESTERASE UR QL STRIP: NEGATIVE
LYMPHOCYTES # BLD AUTO: 2.1 10*3/UL
LYMPHOCYTES NFR BLD AUTO: 15 % (ref 11–47)
MCH RBC QN AUTO: 32.4 PG (ref 28–33)
MCHC RBC AUTO-ENTMCNC: 33.7 G/DL (ref 32–36)
MCV RBC AUTO: 96 FL (ref 81–97)
MONOCYTES # BLD AUTO: 1.2 10*3/UL
MONOCYTES NFR BLD AUTO: 9 % (ref 3–11)
NEUTROPHILS # BLD AUTO: 10.1 10*3/UL
NEUTROPHILS NFR BLD AUTO: 73.3 % (ref 41–81)
NITRITE UR QL: NEGATIVE
PH UR: 6 PH
PLATELET # BLD AUTO: 265 10*3/UL (ref 140–350)
PMV BLD AUTO: 7.1 FL (ref 6.9–10.8)
POTASSIUM SERPL-SCNC: 4 MMOL/L (ref 3.5–5.1)
PROT SERPL-MCNC: 7.4 G/DL (ref 6–8.3)
PROT UR STRIP-MCNC: NEGATIVE MG/DL
RBC # BLD AUTO: 4.34 10*6/ΜL (ref 3.7–5.3)
RBC #/AREA URNS HPF: NORMAL /HPF
SARS-COV-2 RNA RESP QL NAA+PROBE: NEGATIVE
SODIUM SERPL-SCNC: 140 MMOL/L (ref 135–145)
SP GR UR: 1.01 (ref 1–1.03)
SQUAMOUS #/AREA URNS HPF: NORMAL /HPF
UROBILINOGEN UR-MCNC: 0.2 MG/DL
WBC # BLD AUTO: 13.8 10*3/UL (ref 4.5–10.5)
WBC #/AREA URNS HPF: NORMAL /HPF

## 2022-11-14 PROCEDURE — 81001 URINALYSIS AUTO W/SCOPE: CPT | Performed by: FAMILY MEDICINE

## 2022-11-14 PROCEDURE — 85652 RBC SED RATE AUTOMATED: CPT | Performed by: FAMILY MEDICINE

## 2022-11-14 PROCEDURE — 99214 OFFICE O/P EST MOD 30 MIN: CPT | Performed by: FAMILY MEDICINE

## 2022-11-14 PROCEDURE — 86140 C-REACTIVE PROTEIN: CPT | Performed by: FAMILY MEDICINE

## 2022-11-14 PROCEDURE — C9803 HOPD COVID-19 SPEC COLLECT: HCPCS | Mod: CS | Performed by: FAMILY MEDICINE

## 2022-11-14 PROCEDURE — 36415 COLL VENOUS BLD VENIPUNCTURE: CPT | Performed by: FAMILY MEDICINE

## 2022-11-14 PROCEDURE — 71046 X-RAY EXAM CHEST 2 VIEWS: CPT

## 2022-11-14 PROCEDURE — U0005 INFEC AGEN DETEC AMPLI PROBE: HCPCS | Performed by: FAMILY MEDICINE

## 2022-11-14 PROCEDURE — 85025 COMPLETE CBC W/AUTO DIFF WBC: CPT | Performed by: FAMILY MEDICINE

## 2022-11-14 PROCEDURE — G0463 HOSPITAL OUTPT CLINIC VISIT: HCPCS

## 2022-11-14 PROCEDURE — 80053 COMPREHEN METABOLIC PANEL: CPT | Performed by: FAMILY MEDICINE

## 2022-11-14 RX ORDER — MONTELUKAST SODIUM 10 MG/1
10 TABLET ORAL NIGHTLY
Qty: 90 TABLET | Refills: 3 | Status: SHIPPED | OUTPATIENT
Start: 2022-11-14

## 2022-11-14 NOTE — PROGRESS NOTES
I would like your consent to use a new technology to help document today's visit.  The technology is called Dragon Ambient eXperience or AGUSTO for short.  The AGUSTO technology will securely listen to your visit and convert what it hears into an office visit note.  Would you be comfortable using AGUSTO during your visit today?    Patient's response: Yes    Valorie Hermosillo  1950    Subjective     CC:  Chief Complaint   Patient presents with    Cough       HPI:  Valorie Hermosillo is a 72 y.o. female who presents for evaluation of COVID-19.    The patient reports that she believes she has COVID-19. She states that her symptoms began with a cough approximately 3 days ago. She notes that she has chills, body aches, and a slight headache. She notes a mild sore throat. She states that she is lethargic and fatigued. She denies any loss of her taste or smell. She denies testing for COVID-19 at home. She notes that she has rheumatoid arthritis and was infused last time.     She states that she likes the Cimzia. She adds that she takes hydroxychloroquine for 2 weeks. She denies ever having had a bone density scan or taking a calcium supplement. She reports that she went to a pulmonologist and was given montelukast, but states that it is  and she was told she had to go back to the office.     The following were reviewed and discussed and updated as appropriate:          Problem List:  Patient Active Problem List   Diagnosis    Rheumatoid arthritis involving multiple sites with positive rheumatoid factor (CMS/[facility]) ([facility])    History of Sjogren's disease (CMS/[facility]) ([facility])    COVID-19    Pulmonary infiltrate       Past Medical History:  Past Medical History:   Diagnosis Date    RA (rheumatoid arthritis) (CMS/[facility]) ([facility])        Past Surgical History:   Procedure Laterality Date    BRONCHOSCOPY N/A 2021    Procedure: BRONCHOSCOPY with bronchoaveolar lavage;  Surgeon: Andres Rios MD;  Location: [facility];  Service:  Endoscopy;  Laterality: N/A;    HYSTERECTOMY         Social History:  Social History     Tobacco Use    Smoking status: Former     Years: 25.00     Types: Cigarettes     Quit date: 2009     Years since quittin.5    Smokeless tobacco: Never   Vaping Use    Vaping Use: Never used   Substance Use Topics    Alcohol use: Yes     Comment: wine 1-2 glasses 3-4 nights a week    Drug use: Never      Family History:  Family History   Problem Relation Age of Onset    Cancer Father     Cancer Sister        Allergies:  Allergies   Allergen Reactions    Darvocet A500 [Propoxyphene N-Acetaminophen]        Medications:   Current Outpatient Medications on File Prior to Visit   Medication Sig Dispense Refill    albuterol HFA 90 mcg/actuation inhaler Inhale 2 puffs every 6 (six) hours as needed for wheezing 1 Inhaler 11    certolizumab pegoL (Cimzia) 400 mg/2 mL (200 mg/mL x 2) syringe Inject under the skin every 28 (twenty-eight) days      hydrOXYchloroQUINE (PLAQUENIL) 200 mg tablet Take by mouth daily       No current facility-administered medications on file prior to visit.       Review of Systems:  Constitutional: Negative for activity change, appetite change, fever, and unexpected weight change. Positive for cough, fatigue, chills, body aches.  HENT: Negative for congestion, sinus pressure, and voice change.  Eyes: Negative for eye pain and visual disturbance.   Respiratory: Negative for chest tightness and shortness of breath.  Positive for cough  Cardiovascular: Negative for chest pain and leg swelling.   Gastrointestinal: Negative for abdominal pain, diarrhea, and vomiting.  Genitourinary: Negative for dysuria, frequency, hematuria, and urgency.   Skin: Negative for rash and wounds.   Allergic/Immunologic: Positive for immunocompromised state.   Neurological: Negative for dizziness and weakness.  Hematologic: Negative for adenopathy.   Psychiatric/Behavioral: Negative for decreased concentration, dysphoric mood, and  "sleep disturbance.    Objective     EXAM:  /61 (BP Location: Left arm, Patient Position: Sitting, Cuff Size: Regular Adult)   Pulse 64   Ht 1.727 m (5' 8\")   Wt 54.4 kg (120 lb)   SpO2 95%   BMI 18.25 kg/m²     Constitutional: Appears fatigued  HENT: Head normocephalic and atraumatic, nose normal, mucous membranes are moist.   Eyes: Extraocular movements intact, conjunctivae normal, pupils are equal, round, and reactive to light.   Cardiovascular: Normal rate and regular rhythm, normal pulses, no murmur heard.  Pulmonary: Pulmonary effort is normal. No respiratory distress. Expiratory wheezes left lobe  Musculoskeletal: No swelling.  Skin: Skin is warm and dry.   Neurological: No focal deficit present. Patient is alert.   Psychiatric: Mood normal.        Labs/Imaging:  X-ray chest 2 views  Narrative: Exam:   Chest x-ray, 2 views 11/14/2022    Clinical History:  Chronic cough; cough    Comparison(s):  8/29/2021    Findings:    Top normal heart size. Vague left mid lung opacification, atelectasis versus pneumonia.    Vascular calcifications.  Impression: IMPRESSION:    Vague left mid lung opacification, atelectasis versus pneumonia.      Assessment/Plan   A/P/Discussion:  Community-acquired pneumonia-and bradycardia for the patient at this time given the patient's immunocompromise status.  I will the patient follow-up for annual as soon as possible.  Should be seen in the next 1 to 2 weeks or return in the event that she has new or worsening symptoms.  ORDERS:  Diagnoses and all orders for this visit:    Cough, unspecified type  -     COVID-19 PCR    Nonintractable headache, unspecified chronicity pattern, unspecified headache type  -     COVID-19 PCR    Chronic cough  -     montelukast (SINGULAIR) 10 mg tablet; Take 1 tablet (10 mg total) by mouth nightly    Post-menopausal  -     DXA bone density         Follow Up from this visit:  Return in about 1 week (around 11/21/2022).  Patient Instructions:  There " are no Patient Instructions on file for this visit.    Electronically signed by: Connor Zaidi MD  11/15/2022  11:25 AM    ATTESTATION  Draft generated by Suzan LIZ and edited by Deanna Hurley, Quality  on 11/15/22.

## 2022-11-22 ENCOUNTER — OFFICE VISIT (OUTPATIENT)
Dept: FAMILY MEDICINE | Facility: CLINIC | Age: 72
End: 2022-11-22
Payer: MEDICARE

## 2022-11-22 ENCOUNTER — HOSPITAL ENCOUNTER (OUTPATIENT)
Dept: BONE DENSITY | Facility: HOSPITAL | Age: 72
Discharge: HOME | End: 2022-11-22
Payer: MEDICARE

## 2022-11-22 ENCOUNTER — HOSPITAL ENCOUNTER (OUTPATIENT)
Dept: CT IMAGING | Facility: HOSPITAL | Age: 72
Discharge: HOME | End: 2022-11-22
Payer: MEDICARE

## 2022-11-22 VITALS
DIASTOLIC BLOOD PRESSURE: 78 MMHG | OXYGEN SATURATION: 97 % | SYSTOLIC BLOOD PRESSURE: 112 MMHG | BODY MASS INDEX: 18.19 KG/M2 | HEART RATE: 76 BPM | WEIGHT: 120 LBS | HEIGHT: 68 IN

## 2022-11-22 DIAGNOSIS — J18.9 PNEUMONIA OF LEFT LUNG DUE TO INFECTIOUS ORGANISM, UNSPECIFIED PART OF LUNG: Primary | ICD-10-CM

## 2022-11-22 DIAGNOSIS — R07.9 CHEST PAIN, UNSPECIFIED TYPE: ICD-10-CM

## 2022-11-22 LAB
BASOPHILS # BLD AUTO: 0.1 10*3/UL
BASOPHILS NFR BLD AUTO: 1 % (ref 0–2)
CRP SERPL-MCNC: 10.8 MG/L
EOSINOPHIL # BLD AUTO: 0.3 10*3/UL
EOSINOPHIL NFR BLD AUTO: 2.9 % (ref 0–3)
ERYTHROCYTE [DISTWIDTH] IN BLOOD BY AUTOMATED COUNT: 12.1 % (ref 11.5–14)
ERYTHROCYTE [SEDIMENTATION RATE] IN BLOOD: 23 MM/HR
HCT VFR BLD AUTO: 42.5 % (ref 34–45)
HGB BLD-MCNC: 14.4 G/DL (ref 11.5–15.5)
LYMPHOCYTES # BLD AUTO: 2.5 10*3/UL
LYMPHOCYTES NFR BLD AUTO: 28.2 % (ref 11–47)
MCH RBC QN AUTO: 32.1 PG (ref 28–33)
MCHC RBC AUTO-ENTMCNC: 33.8 G/DL (ref 32–36)
MCV RBC AUTO: 95 FL (ref 81–97)
MONOCYTES # BLD AUTO: 0.5 10*3/UL
MONOCYTES NFR BLD AUTO: 5.7 % (ref 3–11)
NEUTROPHILS # BLD AUTO: 5.6 10*3/UL
NEUTROPHILS NFR BLD AUTO: 62.2 % (ref 41–81)
PLATELET # BLD AUTO: 443 10*3/UL (ref 140–350)
PMV BLD AUTO: 6.4 FL (ref 6.9–10.8)
RBC # BLD AUTO: 4.47 10*6/ΜL (ref 3.7–5.3)
WBC # BLD AUTO: 8.9 10*3/UL (ref 4.5–10.5)

## 2022-11-22 PROCEDURE — 71260 CT THORAX DX C+: CPT

## 2022-11-22 PROCEDURE — G0463 HOSPITAL OUTPT CLINIC VISIT: HCPCS

## 2022-11-22 PROCEDURE — 36415 COLL VENOUS BLD VENIPUNCTURE: CPT | Performed by: FAMILY MEDICINE

## 2022-11-22 PROCEDURE — 99214 OFFICE O/P EST MOD 30 MIN: CPT | Performed by: FAMILY MEDICINE

## 2022-11-22 PROCEDURE — 77080 DXA BONE DENSITY AXIAL: CPT | Mod: 26 | Performed by: INTERNAL MEDICINE

## 2022-11-22 PROCEDURE — 85025 COMPLETE CBC W/AUTO DIFF WBC: CPT | Performed by: FAMILY MEDICINE

## 2022-11-22 PROCEDURE — 77080 DXA BONE DENSITY AXIAL: CPT

## 2022-11-22 PROCEDURE — 85652 RBC SED RATE AUTOMATED: CPT | Performed by: FAMILY MEDICINE

## 2022-11-22 PROCEDURE — 86140 C-REACTIVE PROTEIN: CPT | Performed by: FAMILY MEDICINE

## 2022-11-22 PROCEDURE — 2550000100 HC RX 255: Performed by: FAMILY MEDICINE

## 2022-11-22 RX ORDER — IOPAMIDOL 755 MG/ML
80 INJECTION, SOLUTION INTRAVASCULAR ONCE
Status: COMPLETED | OUTPATIENT
Start: 2022-11-22 | End: 2022-11-22

## 2022-11-22 RX ADMIN — IOPAMIDOL 80 ML: 755 INJECTION, SOLUTION INTRAVENOUS at 13:55

## 2022-11-22 NOTE — PROGRESS NOTES
I would like your consent to use a new technology to help document today's visit.  The technology is called Dragon Ambient eXperience or AGUSTO for short.  The AGUSTO technology will securely listen to your visit and convert what it hears into an office visit note.  Would you be comfortable using AGUSTO during your visit today?    Patient's response: Yes    Valorie Hermosillo  1950    Subjective     CC:  Chief Complaint   Patient presents with    Pneumonia       HPI:  Valorie Hermosillo is a 72 y.o. female who presents for follow-up for pneumonia. The patient was seen on 11/14/2022 for a cough, headache, and fever. At that time, the patient underwent labs noting elevation in her inflammatory markers with a CRP of 130, white count was at 14, and a chest x-ray was performed at that time noting mid left lung pneumonia for which the patient has since been on antibiotics.    The patient reports that she feels better in stages. She notes that her chest still hurts. She states that she is going to leave on 11/28/2022 for 4 months and will have 2 more days on the antibiotic. She notes that she had a cough last year when she got to [place]. The patient states that she has been on Flovent, however she has not been using it. She notes that she used the albuterol this morning because it felt like it was tight. The patient states that she is still fatigued. She goes to bed early and wakes up feeling good but then it catches up with her. She denies feeling weak. The patient states that she is sleeping well. She notes that it is difficult for her to sit still and then she is unable to stop so if she exerts herself, she will not feel well. The patient states that her albuterol helps her symptoms for 1 to 2 hours. She still reports an ongoing cough. She notes an unproductive cough, but is coughing often. She denies coughing up blood. She gets a Cimzia injection once a month which is due on 12/02/2022 and was told not to get the injection  if she is sick. She feels like she feels a bit better, but her symptoms are ongoing for 7 or 8 days now.     The following were reviewed and discussed and updated as appropriate:   Tobacco  Allergies  Meds  Problems  Med Hx  Surg Hx  Fam Hx         Problem List:  Patient Active Problem List   Diagnosis    Rheumatoid arthritis involving multiple sites with positive rheumatoid factor (CMS/HCC) ([facility])    History of Sjogren's disease (CMS/HCC) ([facility])    COVID-19    Pulmonary infiltrate       Past Medical History:  Past Medical History:   Diagnosis Date    RA (rheumatoid arthritis) (CMS/[facility]) ([facility])        Past Surgical History:   Procedure Laterality Date    BRONCHOSCOPY N/A 2021    Procedure: BRONCHOSCOPY with bronchoaveolar lavage;  Surgeon: Andres Rios MD;  Location: [facility];  Service: Endoscopy;  Laterality: N/A;    HYSTERECTOMY         Social History:  Social History     Tobacco Use    Smoking status: Former     Years: 25.00     Types: Cigarettes     Quit date: 2009     Years since quittin.5    Smokeless tobacco: Never   Vaping Use    Vaping Use: Never used   Substance Use Topics    Alcohol use: Yes     Comment: wine 1-2 glasses 3-4 nights a week    Drug use: Never      Family History:  Family History   Problem Relation Age of Onset    Cancer Father     Cancer Sister        Allergies:  Allergies   Allergen Reactions    Darvocet A500 [Propoxyphene N-Acetaminophen]        Medications:   Current Outpatient Medications on File Prior to Visit   Medication Sig Dispense Refill    montelukast (SINGULAIR) 10 mg tablet Take 1 tablet (10 mg total) by mouth nightly 90 tablet 3    cephalexin (Keflex) 500 mg capsule Take 1 capsule (500 mg total) by mouth 3 (three) times a day 30 capsule 0    albuterol HFA 90 mcg/actuation inhaler Inhale 2 puffs every 6 (six) hours as needed for wheezing 1 Inhaler 11    certolizumab pegoL (Cimzia) 400 mg/2 mL (200 mg/mL x 2) syringe Inject under the skin every 28  "(twenty-eight) days      hydrOXYchloroQUINE (PLAQUENIL) 200 mg tablet Take by mouth daily       No current facility-administered medications on file prior to visit.       Review of Systems:  Constitutional: Negative for activity change, appetite change,  fever, and unexpected weight change. Positive for mild fatigue but improving (minimal)  HENT: Negative for congestion, sinus pressure, and voice change.  Eyes: Negative for eye pain and visual disturbance.   Respiratory: Negative for chest tightness and shortness of breath.  Continued cough  Cardiovascular: Negative for chest pain and leg swelling.   Musculoskeletal: Negative for back pain.   Skin: Negative for rash and wounds.   Allergic/Immunologic: Positive for immunocompromised state.   Neurological: Negative for dizziness and weakness.  Hematologic: Negative for adenopathy.     Objective     EXAM:  /78   Pulse 76   Ht 1.727 m (5' 8\")   Wt 54.4 kg (120 lb)   SpO2 97%   BMI 18.25 kg/m²     Constitutional: Normal appearance.   HENT: Head normocephalic and atraumatic, nose normal, mucous membranes are moist.   Eyes: Extraocular movements intact, conjunctivae normal, pupils are equal, round, and reactive to light.   Cardiovascular: Normal rate and regular rhythm, normal pulses, no murmur heard.  Pulmonary: Pulmonary effort is normal. No respiratory distress. Normal breath sounds. No wheezing or rales.   Musculoskeletal: No swelling.   Lymphadenopathy: No cervical adenopathy.   Skin: Skin is warm and dry.   Neurological: No focal deficit present. Patient is alert.   Psychiatric: Mood normal.      Procedures    Labs/Imaging:    Assessment/Plan   A/P/Discussion:    Diagnoses and all orders for this visit:    Pneumonia of left lung due to infectious organism, unspecified part of lung  Will recheck CRP. Will obtain further imaging with a chest CT. Pending those results, can start her on a course of steroids.   -     CBC w/auto differential Blood, Venous  -     " C-reactive protein (Inflammation) Blood, Venous  -     Sedimentation rate, automated Blood, Venous  -     CT chest with IV contrast    Chest pain, unspecified type  -     CBC w/auto differential Blood, Venous  -     C-reactive protein (Inflammation) Blood, Venous  -     Sedimentation rate, automated Blood, Venous  -     CT chest with IV contrast     *abnormal CT reviewed with pulmonology, recommended sputum culture with AFB x 3, if negative may need bronch for diagnosis. Follow up with pulm, will contact the patient with sputum cultures and should return with new or worsening symptoms.   Follow Up from this visit:  Return for with pulmonology.  Patient Instructions:  There are no Patient Instructions on file for this visit.    Electronically signed by: Connor Zaidi MD  11/27/2022  7:14 AM    ATTESTATION  Draft generated by Suzan LIZ and edited by Deanna Hurley, Quality  on 11/27/22.

## 2022-11-23 DIAGNOSIS — J18.9 PNEUMONIA OF LEFT LUNG DUE TO INFECTIOUS ORGANISM, UNSPECIFIED PART OF LUNG: Primary | ICD-10-CM

## 2022-11-29 ENCOUNTER — TELEPHONE (OUTPATIENT)
Dept: FAMILY MEDICINE | Facility: CLINIC | Age: 72
End: 2022-11-29
Payer: MEDICARE

## 2022-11-29 DIAGNOSIS — J45.901 ASTHMA EXACERBATION: ICD-10-CM

## 2022-11-29 RX ORDER — FLUTICASONE PROPIONATE 100 UG/1
1 POWDER, METERED RESPIRATORY (INHALATION) 2 TIMES DAILY
Qty: 60 EACH | Refills: 11 | Status: SHIPPED | OUTPATIENT
Start: 2022-11-29 | End: 2022-12-28 | Stop reason: ALTCHOICE

## 2022-12-28 ENCOUNTER — TELEPHONE (OUTPATIENT)
Dept: FAMILY MEDICINE | Facility: CLINIC | Age: 72
End: 2022-12-28
Payer: MEDICARE

## 2022-12-28 NOTE — TELEPHONE ENCOUNTER
Spoke with patient concerning who has been prescribing medication which was Dr. Rios. She stated that his office wants her to set up appt and thought that Dr. Zaidi would be ok to fill. She plans on seeing a provider in [place] and to disregard request. Will d/c flovent diskus as this is too expensive for patient to pay.

## 2022-12-28 NOTE — TELEPHONE ENCOUNTER
Valorie would like to change her fluticason propionate (FLOVENT) diskus changed to a regular inhaler as the diskus is too expensive and was told by [facility] if this was changed to an inhaler it'd be cheaper.     Her is currently in [place], [place] and wants it sent to the [facility] there:     Phone: [phone]    Address: [address] [place], [place] [address], [place]  [address]

## 2023-01-31 ENCOUNTER — TELEPHONE (OUTPATIENT)
Dept: FAMILY MEDICINE | Facility: CLINIC | Age: 73
End: 2023-01-31
Payer: MEDICARE

## 2023-01-31 NOTE — TELEPHONE ENCOUNTER
Valorie requests an appointment to see Dr Zaidi this Friday for her asthma.     She has had asthma attacks and has no set plan of care for this.    Valorie is home in [place] until she leaves out of [facility] again 2/8 and wont be back until April    Next available is 2/28 @ 2:30.     Next Subacute 2/7     Anything available this Friday?

## 2023-02-01 NOTE — TELEPHONE ENCOUNTER
Rich santillan voicemail explaining Dr Zaidi does not have any open availability Friday and recommends coming in to see Dr Titus in walk-in 2/3 or se Dr Zaidi 2/6

## 2023-05-29 ENCOUNTER — HOSPITAL ENCOUNTER (OUTPATIENT)
Facility: HOSPITAL | Age: 73
Setting detail: INFUSION SERIES
Discharge: STILL ADMITTED OR ADMINISTRATIVE | End: 2023-05-29
Payer: MEDICARE

## 2023-05-29 VITALS
RESPIRATION RATE: 16 BRPM | HEART RATE: 64 BPM | SYSTOLIC BLOOD PRESSURE: 137 MMHG | OXYGEN SATURATION: 97 % | DIASTOLIC BLOOD PRESSURE: 67 MMHG | TEMPERATURE: 97.6 F

## 2023-05-29 DIAGNOSIS — M05.79 RHEUMATOID ARTHRITIS INVOLVING MULTIPLE SITES WITH POSITIVE RHEUMATOID FACTOR (CMS/HCC): Primary | ICD-10-CM

## 2023-05-29 PROCEDURE — 6360000200 HC RX 636 W HCPCS (ALT 250 FOR IP): Performed by: INTERNAL MEDICINE

## 2023-05-29 PROCEDURE — 96372 THER/PROPH/DIAG INJ SC/IM: CPT

## 2023-05-29 RX ADMIN — CERTOLIZUMAB PEGOL 400 MG: KIT at 09:43

## 2023-06-29 ENCOUNTER — HOSPITAL ENCOUNTER (OUTPATIENT)
Facility: HOSPITAL | Age: 73
Setting detail: INFUSION SERIES
Discharge: STILL ADMITTED OR ADMINISTRATIVE | End: 2023-06-29
Payer: MEDICARE

## 2023-06-29 VITALS
RESPIRATION RATE: 14 BRPM | TEMPERATURE: 98.3 F | OXYGEN SATURATION: 95 % | DIASTOLIC BLOOD PRESSURE: 56 MMHG | HEART RATE: 58 BPM | SYSTOLIC BLOOD PRESSURE: 113 MMHG

## 2023-06-29 DIAGNOSIS — M05.79 RHEUMATOID ARTHRITIS INVOLVING MULTIPLE SITES WITH POSITIVE RHEUMATOID FACTOR (CMS/HCC): Primary | ICD-10-CM

## 2023-06-29 PROCEDURE — 6360000200 HC RX 636 W HCPCS (ALT 250 FOR IP)

## 2023-06-29 PROCEDURE — 96372 THER/PROPH/DIAG INJ SC/IM: CPT

## 2023-06-29 RX ADMIN — CERTOLIZUMAB PEGOL 400 MG: KIT at 09:19

## 2023-07-19 DIAGNOSIS — M05.79 RHEUMATOID ARTHRITIS INVOLVING MULTIPLE SITES WITH POSITIVE RHEUMATOID FACTOR (CMS/HCC): Primary | ICD-10-CM

## 2023-07-19 RX ORDER — ALBUTEROL SULFATE 0.83 MG/ML
2.5 SOLUTION RESPIRATORY (INHALATION) AS NEEDED
Status: CANCELLED | OUTPATIENT
Start: 2024-01-25

## 2023-07-19 RX ORDER — ACETAMINOPHEN 500 MG
500 TABLET ORAL EVERY 4 HOURS PRN
Status: CANCELLED | OUTPATIENT
Start: 2024-03-21

## 2023-07-19 RX ORDER — SODIUM CHLORIDE 9 MG/ML
0-999 INJECTION, SOLUTION INTRAVENOUS CONTINUOUS PRN
Status: CANCELLED | OUTPATIENT
Start: 2024-05-23 | End: 2024-05-30

## 2023-07-19 RX ORDER — FAMOTIDINE 10 MG/ML
20 INJECTION INTRAVENOUS AS NEEDED
Status: CANCELLED | OUTPATIENT
Start: 2024-03-21

## 2023-07-19 RX ORDER — ALBUTEROL SULFATE 0.83 MG/ML
2.5 SOLUTION RESPIRATORY (INHALATION) AS NEEDED
Status: CANCELLED | OUTPATIENT
Start: 2024-05-23

## 2023-07-19 RX ORDER — EPINEPHRINE 1 MG/ML
0.3 INJECTION INTRAMUSCULAR; INTRAVENOUS; SUBCUTANEOUS AS NEEDED
Status: CANCELLED | OUTPATIENT
Start: 2024-03-21

## 2023-07-19 RX ORDER — FAMOTIDINE 10 MG/ML
20 INJECTION INTRAVENOUS AS NEEDED
Status: CANCELLED | OUTPATIENT
Start: 2024-05-23

## 2023-07-19 RX ORDER — ALBUTEROL SULFATE 0.83 MG/ML
2.5 SOLUTION RESPIRATORY (INHALATION) AS NEEDED
Status: CANCELLED | OUTPATIENT
Start: 2024-04-18

## 2023-07-19 RX ORDER — FAMOTIDINE 10 MG/ML
20 INJECTION INTRAVENOUS AS NEEDED
Status: CANCELLED | OUTPATIENT
Start: 2024-04-18

## 2023-07-19 RX ORDER — EPINEPHRINE 1 MG/ML
0.3 INJECTION INTRAMUSCULAR; INTRAVENOUS; SUBCUTANEOUS AS NEEDED
Status: CANCELLED | OUTPATIENT
Start: 2024-04-18

## 2023-07-19 RX ORDER — ACETAMINOPHEN 500 MG
500 TABLET ORAL EVERY 4 HOURS PRN
Status: CANCELLED | OUTPATIENT
Start: 2024-04-18

## 2023-07-19 RX ORDER — EPINEPHRINE 1 MG/ML
0.3 INJECTION INTRAMUSCULAR; INTRAVENOUS; SUBCUTANEOUS AS NEEDED
Status: CANCELLED | OUTPATIENT
Start: 2024-05-23

## 2023-07-19 RX ORDER — DIPHENHYDRAMINE HYDROCHLORIDE 50 MG/ML
25-50 INJECTION INTRAMUSCULAR; INTRAVENOUS AS NEEDED
Status: CANCELLED | OUTPATIENT
Start: 2024-02-22

## 2023-07-19 RX ORDER — ACETAMINOPHEN 500 MG
500 TABLET ORAL EVERY 4 HOURS PRN
Status: CANCELLED | OUTPATIENT
Start: 2024-05-23

## 2023-07-19 RX ORDER — SODIUM CHLORIDE 9 MG/ML
0-999 INJECTION, SOLUTION INTRAVENOUS CONTINUOUS PRN
Status: CANCELLED | OUTPATIENT
Start: 2024-03-21 | End: 2024-03-08

## 2023-07-19 RX ORDER — DIPHENHYDRAMINE HYDROCHLORIDE 50 MG/ML
25-50 INJECTION INTRAMUSCULAR; INTRAVENOUS AS NEEDED
Status: CANCELLED | OUTPATIENT
Start: 2024-04-18

## 2023-07-19 RX ORDER — SODIUM CHLORIDE 9 MG/ML
0-999 INJECTION, SOLUTION INTRAVENOUS CONTINUOUS PRN
Status: CANCELLED | OUTPATIENT
Start: 2024-01-25 | End: 2024-01-12

## 2023-07-19 RX ORDER — SODIUM CHLORIDE 9 MG/ML
0-999 INJECTION, SOLUTION INTRAVENOUS CONTINUOUS PRN
Status: CANCELLED | OUTPATIENT
Start: 2024-04-18 | End: 2024-04-05

## 2023-07-19 RX ORDER — ACETAMINOPHEN 500 MG
500 TABLET ORAL EVERY 4 HOURS PRN
Status: CANCELLED | OUTPATIENT
Start: 2024-01-25

## 2023-07-19 RX ORDER — FAMOTIDINE 10 MG/ML
20 INJECTION INTRAVENOUS AS NEEDED
Status: CANCELLED | OUTPATIENT
Start: 2024-01-25

## 2023-07-19 RX ORDER — DIPHENHYDRAMINE HYDROCHLORIDE 50 MG/ML
25-50 INJECTION INTRAMUSCULAR; INTRAVENOUS AS NEEDED
Status: CANCELLED | OUTPATIENT
Start: 2024-03-21

## 2023-07-19 RX ORDER — ALBUTEROL SULFATE 0.83 MG/ML
2.5 SOLUTION RESPIRATORY (INHALATION) AS NEEDED
Status: CANCELLED | OUTPATIENT
Start: 2024-02-22

## 2023-07-19 RX ORDER — FAMOTIDINE 10 MG/ML
20 INJECTION INTRAVENOUS AS NEEDED
Status: CANCELLED | OUTPATIENT
Start: 2024-02-22

## 2023-07-19 RX ORDER — ALBUTEROL SULFATE 0.83 MG/ML
2.5 SOLUTION RESPIRATORY (INHALATION) AS NEEDED
Status: CANCELLED | OUTPATIENT
Start: 2024-03-21

## 2023-07-19 RX ORDER — EPINEPHRINE 1 MG/ML
0.3 INJECTION INTRAMUSCULAR; INTRAVENOUS; SUBCUTANEOUS AS NEEDED
Status: CANCELLED | OUTPATIENT
Start: 2024-02-22

## 2023-07-19 RX ORDER — DIPHENHYDRAMINE HYDROCHLORIDE 50 MG/ML
25-50 INJECTION INTRAMUSCULAR; INTRAVENOUS AS NEEDED
Status: CANCELLED | OUTPATIENT
Start: 2024-05-23

## 2023-07-19 RX ORDER — SODIUM CHLORIDE 9 MG/ML
0-999 INJECTION, SOLUTION INTRAVENOUS CONTINUOUS PRN
Status: CANCELLED | OUTPATIENT
Start: 2024-02-22 | End: 2024-02-09

## 2023-07-19 RX ORDER — EPINEPHRINE 1 MG/ML
0.3 INJECTION INTRAMUSCULAR; INTRAVENOUS; SUBCUTANEOUS AS NEEDED
Status: CANCELLED | OUTPATIENT
Start: 2024-01-25

## 2023-07-19 RX ORDER — ACETAMINOPHEN 500 MG
500 TABLET ORAL EVERY 4 HOURS PRN
Status: CANCELLED | OUTPATIENT
Start: 2024-02-22

## 2023-07-19 RX ORDER — DIPHENHYDRAMINE HYDROCHLORIDE 50 MG/ML
25-50 INJECTION INTRAMUSCULAR; INTRAVENOUS AS NEEDED
Status: CANCELLED | OUTPATIENT
Start: 2024-01-25

## 2023-07-31 ENCOUNTER — HOSPITAL ENCOUNTER (OUTPATIENT)
Facility: HOSPITAL | Age: 73
Discharge: HOME | End: 2023-07-31
Payer: MEDICARE

## 2023-07-31 DIAGNOSIS — M05.79 RHEUMATOID ARTHRITIS INVOLVING MULTIPLE SITES WITH POSITIVE RHEUMATOID FACTOR (CMS/HCC): Primary | ICD-10-CM

## 2023-07-31 PROCEDURE — 96372 THER/PROPH/DIAG INJ SC/IM: CPT

## 2023-07-31 PROCEDURE — 6360000200 HC RX 636 W HCPCS (ALT 250 FOR IP): Performed by: INTERNAL MEDICINE

## 2023-07-31 RX ADMIN — CERTOLIZUMAB PEGOL 400 MG: KIT at 16:07

## 2023-08-23 ENCOUNTER — HOSPITAL ENCOUNTER (OUTPATIENT)
Dept: MAMMOGRAPHY | Facility: HOSPITAL | Age: 73
Discharge: HOME | End: 2023-08-23
Payer: MEDICARE

## 2023-08-23 DIAGNOSIS — Z12.31 BREAST CANCER SCREENING BY MAMMOGRAM: ICD-10-CM

## 2023-08-23 PROCEDURE — 77063 BREAST TOMOSYNTHESIS BI: CPT

## 2023-08-24 DIAGNOSIS — M05.79 RHEUMATOID ARTHRITIS INVOLVING MULTIPLE SITES WITH POSITIVE RHEUMATOID FACTOR (CMS/HCC): Primary | ICD-10-CM

## 2023-08-25 ENCOUNTER — TELEPHONE (OUTPATIENT)
Dept: FAMILY MEDICINE | Facility: CLINIC | Age: 73
End: 2023-08-25
Payer: MEDICARE

## 2023-08-25 NOTE — TELEPHONE ENCOUNTER
"Valorie states that Dr Zaidi is \"writing a referral for a mammogram\".     Valorie wanted to let Dr Zaidi know that she wants to be referred to [facility] in [place] for the mammogram  "

## 2023-08-28 ENCOUNTER — HOSPITAL ENCOUNTER (OUTPATIENT)
Facility: HOSPITAL | Age: 73
Discharge: HOME | End: 2023-08-28
Payer: MEDICARE

## 2023-08-28 ENCOUNTER — OFFICE VISIT (OUTPATIENT)
Dept: URGENT CARE | Facility: CLINIC | Age: 73
End: 2023-08-28
Payer: MEDICARE

## 2023-08-28 VITALS
DIASTOLIC BLOOD PRESSURE: 82 MMHG | RESPIRATION RATE: 16 BRPM | OXYGEN SATURATION: 96 % | HEART RATE: 71 BPM | SYSTOLIC BLOOD PRESSURE: 134 MMHG

## 2023-08-28 VITALS
SYSTOLIC BLOOD PRESSURE: 113 MMHG | DIASTOLIC BLOOD PRESSURE: 50 MMHG | HEART RATE: 66 BPM | RESPIRATION RATE: 18 BRPM | OXYGEN SATURATION: 94 % | TEMPERATURE: 97.8 F

## 2023-08-28 DIAGNOSIS — R92.8 ABNORMAL MAMMOGRAM: Primary | ICD-10-CM

## 2023-08-28 DIAGNOSIS — M05.79 RHEUMATOID ARTHRITIS INVOLVING MULTIPLE SITES WITH POSITIVE RHEUMATOID FACTOR (CMS/HCC): Primary | ICD-10-CM

## 2023-08-28 PROCEDURE — 96372 THER/PROPH/DIAG INJ SC/IM: CPT

## 2023-08-28 PROCEDURE — 6360000200 HC RX 636 W HCPCS (ALT 250 FOR IP)

## 2023-08-28 PROCEDURE — G0463 HOSPITAL OUTPT CLINIC VISIT: HCPCS

## 2023-08-28 PROCEDURE — 99212 OFFICE O/P EST SF 10 MIN: CPT | Performed by: FAMILY MEDICINE

## 2023-08-28 RX ADMIN — CERTOLIZUMAB PEGOL 400 MG: KIT at 08:50

## 2023-08-28 ASSESSMENT — ENCOUNTER SYMPTOMS
DIZZINESS: 0
WEAKNESS: 0
FATIGUE: 0
WOUND: 0

## 2023-08-28 NOTE — PROGRESS NOTES
Valorie Hermosillo  1950    Subjective     CC:  Chief Complaint   Patient presents with    Results       HPI:  Valorie Hermosillo is a 73 y.o. female who presents for questions about her mammogram.    The patient reports that she had a mammogram on 2023. She has not received a call from anyone yet but noted her abnormal result. She has saline implants in her breast. She notes that her breasts are dense. Her implants are under the muscle, which she has had them for 20 years. She was told that she should get them replaced every 10 years. She is unsure if her implants are textured. She was told by her doctor that they will shift as she gets older. Her implants are movable. She is inquiring about the next step. Her last mammogram was years prior to her most recent.    The following were reviewed and discussed and updated as appropriate:   Tobacco  Allergies  Meds  Problems  Med Hx  Surg Hx  Fam Hx         Problem List:  Patient Active Problem List   Diagnosis    Rheumatoid arthritis involving multiple sites with positive rheumatoid factor (CMS/HCC)    History of Sjogren's disease (CMS/HCC)    COVID-19    Pulmonary infiltrate       Past Medical History:  Past Medical History:   Diagnosis Date    RA (rheumatoid arthritis) (CMS/HCC)        Past Surgical History:   Procedure Laterality Date    BRONCHOSCOPY N/A 2021    Procedure: BRONCHOSCOPY with bronchoaveolar lavage;  Surgeon: Andres Rios MD;  Location: [facility];  Service: Endoscopy;  Laterality: N/A;    HYSTERECTOMY         Social History:  Social History     Tobacco Use    Smoking status: Former     Years: 25     Types: Cigarettes     Quit date: 2009     Years since quittin.3    Smokeless tobacco: Never   Vaping Use    Vaping Use: Never used   Substance Use Topics    Alcohol use: Yes     Comment: wine 1-2 glasses 3-4 nights a week    Drug use: Never      Family History:  Family History   Problem Relation Age of Onset    Cancer  Father     Cancer Sister        Allergies:  Allergies   Allergen Reactions    Darvocet A500 [Propoxyphene N-Acetaminophen]        Medications:   Current Outpatient Medications on File Prior to Visit   Medication Sig Dispense Refill    montelukast (SINGULAIR) 10 mg tablet Take 1 tablet (10 mg total) by mouth nightly 90 tablet 3    albuterol HFA 90 mcg/actuation inhaler Inhale 2 puffs every 6 (six) hours as needed for wheezing 1 Inhaler 11    certolizumab pegoL (Cimzia) 400 mg/2 mL (200 mg/mL x 2) syringe Inject under the skin every 28 (twenty-eight) days      hydrOXYchloroQUINE (PLAQUENIL) 200 mg tablet Take by mouth daily       No current facility-administered medications on file prior to visit.       Review of Systems:  Review of Systems   Constitutional:  Negative for fatigue.   Skin:  Negative for rash and wound.   Allergic/Immunologic: Negative for immunocompromised state.   Neurological:  Negative for dizziness and weakness.       Objective   EXAM:  /82   Pulse 71   Resp 16   SpO2 96%   Physical Exam  Vitals reviewed.   Constitutional:       Appearance: Normal appearance.   HENT:      Head: Normocephalic and atraumatic.      Mouth/Throat:      Mouth: Mucous membranes are moist.   Eyes:      Extraocular Movements: Extraocular movements intact.      Pupils: Pupils are equal, round, and reactive to light.   Musculoskeletal:         General: No swelling.   Skin:     General: Skin is warm and dry.   Neurological:      General: No focal deficit present.      Mental Status: She is alert.   Psychiatric:         Mood and Affect: Mood normal.       Procedures      Assessment/Plan   A/P/Discussion:  Diagnoses and all orders for this visit:    Abnormal mammogram  -     BI diagnostic mammogram left; Future    We will order a diagnostic mammogram, expectation reviewed and patient voiced understanding     Follow Up from this visit:  No follow-ups on file.  Patient Instructions:  There are no Patient Instructions on  file for this visit.    Electronically signed by: Connor Zaidi MD  8/28/2023  10:37 PM    A voice to text program was used to aid in medical record documentation. Sometimes words are printed not exactly as they were spoken. While efforts were made to carefully edit and correct any inaccuracies, some errors may be present. Errors should be taken within the context of the discussion.  Please contact our office if you need assistance interpreting this medical record or notice any mistakes.     ATTESTATION:    Draft generated by [employer] AGUSTO and edited by Angelica Novak, Quality  on 08/28/2023.

## 2023-10-02 ENCOUNTER — HOSPITAL ENCOUNTER (OUTPATIENT)
Facility: HOSPITAL | Age: 73
Discharge: HOME | End: 2023-10-02
Payer: MEDICARE

## 2023-10-02 VITALS
OXYGEN SATURATION: 95 % | TEMPERATURE: 97.6 F | DIASTOLIC BLOOD PRESSURE: 53 MMHG | RESPIRATION RATE: 18 BRPM | SYSTOLIC BLOOD PRESSURE: 122 MMHG | HEART RATE: 57 BPM

## 2023-10-02 DIAGNOSIS — M05.79 RHEUMATOID ARTHRITIS INVOLVING MULTIPLE SITES WITH POSITIVE RHEUMATOID FACTOR (CMS/HCC): Primary | ICD-10-CM

## 2023-10-02 PROCEDURE — 96372 THER/PROPH/DIAG INJ SC/IM: CPT

## 2023-10-02 PROCEDURE — 6360000200 HC RX 636 W HCPCS (ALT 250 FOR IP)

## 2023-10-02 RX ADMIN — CERTOLIZUMAB PEGOL 400 MG: KIT at 08:39

## 2023-10-16 ENCOUNTER — OFFICE VISIT (OUTPATIENT)
Dept: URGENT CARE | Facility: CLINIC | Age: 73
End: 2023-10-16
Payer: MEDICARE

## 2023-10-16 ENCOUNTER — HOSPITAL ENCOUNTER (OUTPATIENT)
Dept: RADIOLOGY | Facility: HOSPITAL | Age: 73
Discharge: HOME | End: 2023-10-16
Payer: MEDICARE

## 2023-10-16 VITALS
SYSTOLIC BLOOD PRESSURE: 122 MMHG | BODY MASS INDEX: 18.26 KG/M2 | TEMPERATURE: 97.6 F | HEIGHT: 68 IN | HEART RATE: 61 BPM | DIASTOLIC BLOOD PRESSURE: 74 MMHG | WEIGHT: 120.5 LBS | OXYGEN SATURATION: 95 %

## 2023-10-16 DIAGNOSIS — M79.671 FOOT PAIN, RIGHT: Primary | ICD-10-CM

## 2023-10-16 DIAGNOSIS — M20.41 HAMMER TOE OF RIGHT FOOT: ICD-10-CM

## 2023-10-16 DIAGNOSIS — M79.671 FOOT PAIN, RIGHT: ICD-10-CM

## 2023-10-16 PROCEDURE — G0463 HOSPITAL OUTPT CLINIC VISIT: HCPCS

## 2023-10-16 PROCEDURE — 73630 X-RAY EXAM OF FOOT: CPT | Mod: RT

## 2023-10-16 PROCEDURE — 99213 OFFICE O/P EST LOW 20 MIN: CPT | Performed by: FAMILY MEDICINE

## 2023-10-16 ASSESSMENT — PAIN SCALES - GENERAL: PAINLEVEL: 8

## 2023-10-16 NOTE — PROGRESS NOTES
"Valorie Hermosillo  1950    Subjective     CC:  Chief Complaint   Patient presents with    Pain     R foot pain-no injury. Has RA and is wondering if it is from that but she has never had an x-ray on it       HPI:  Valorie Hermosillo is a 73 y.o. female who presents for right foot pain.     The patient states that she was unable to step using his feet, but it feels like stepping on \"knives\" when she does. She feels pain a few years ago. She notes that it does not started gradually. She started having pain a few years ago. She was told that her bones was damaged. She did not undergo an x-ray at that time. In order to prevent pressures on his feet, she tries to find the right footwear. She does not have fat on her foot. She feels a slight pain when wearing some shoes or if she ambulates barefooted. She tries rolling her foot all the time to try to offload it, but her legs hurt.     The following were reviewed and discussed and updated as appropriate:   Tobacco  Allergies  Meds  Problems  Med Hx  Surg Hx  Fam Hx         Problem List:  Patient Active Problem List   Diagnosis    Rheumatoid arthritis involving multiple sites with positive rheumatoid factor (CMS/HCC)    History of Sjogren's disease (CMS/Formerly [facility])    COVID-19    Pulmonary infiltrate       Past Medical History:  Past Medical History:   Diagnosis Date    RA (rheumatoid arthritis) (CMS/Formerly [facility])        Past Surgical History:   Procedure Laterality Date    BRONCHOSCOPY N/A 2021    Procedure: BRONCHOSCOPY with bronchoaveolar lavage;  Surgeon: Andres Rios MD;  Location: [facility];  Service: Endoscopy;  Laterality: N/A;    HYSTERECTOMY         Social History:  Social History     Tobacco Use    Smoking status: Former     Years: 25     Types: Cigarettes     Quit date: 2009     Years since quittin.4    Smokeless tobacco: Never   Vaping Use    Vaping Use: Never used   Substance Use Topics    Alcohol use: Yes     Comment: wine 1-2 glasses 3-4 " "nights a week    Drug use: Never      Family History:  Family History   Problem Relation Age of Onset    Cancer Father     Cancer Sister        Allergies:  Allergies   Allergen Reactions    Darvocet A500 [Propoxyphene N-Acetaminophen]        Medications:   Current Outpatient Medications on File Prior to Visit   Medication Sig Dispense Refill    montelukast (SINGULAIR) 10 mg tablet Take 1 tablet (10 mg total) by mouth nightly 90 tablet 3    albuterol HFA 90 mcg/actuation inhaler Inhale 2 puffs every 6 (six) hours as needed for wheezing 1 Inhaler 11    certolizumab pegoL (Cimzia) 400 mg/2 mL (200 mg/mL x 2) syringe Inject under the skin every 28 (twenty-eight) days      hydrOXYchloroQUINE (PLAQUENIL) 200 mg tablet Take by mouth daily       No current facility-administered medications on file prior to visit.       Review of Systems:  Review of Systems  Constitutional: Negative for activity change, appetite change, fatigue, fever, and unexpected weight change.  Musculoskeletal: Positive for foot pain.  Skin: Negative for rash and wounds.  Neurological: Negative numbness or tingling      Objective   EXAM:  /74 (BP Location: Left arm, Patient Position: Sitting, Cuff Size: Regular Adult)   Pulse 61   Temp 36.4 °C (97.6 °F) (Oral)   Ht 1.727 m (5' 8\")   Wt 54.7 kg (120 lb 8 oz)   SpO2 95%   BMI 18.32 kg/m²   Physical Exam  Constitutional: Normal appearance.  HENT: Head normocephalic and atraumatic, nose normal, mucous membranes are moist.  Musculoskeletal: Deformity noted of the right foot where the patient has 2 hammertoes of toes 3 and 4.  Patient's pain is along the MTP range in those 2 areas.  Concerning that the patient's deformity is actually generating the pain when she is walking.  Did recommend some additional padding in that area and follow-up with the foot and ankle specialist.   Skin: Skin is warm and dry.  Neurological: No focal deficit present. Patient is alert.  Psychiatric: Mood normal.    X-ray " foot 3 or more views right  Narrative: Exam: 3 views Right foot  10/16/2023    Clinical History:  Foot pain  right; foot pain    Procedure/Views:  3 views Right foot    Comparison/s:   None    Findings:  Moderate arthropathy of the first MTP joint. Bunion changes. Chronic degeneration and erosion of the third metatarsal head. Associated degenerative joint space narrowing. No acute soft tissue findings. No fracture identified.  Impression: IMPRESSION:  Degenerative changes. No definite acute process.      Assessment/Plan   Diagnoses and all orders for this visit:    Foot pain, right  -     X-ray foot 3 or more views right; Future    Hammer toe of right foot    Reviewed the patient's deformity and pain as well as her x-ray at this time.  Recommend additional padding in the area to offload some of the pain given the patient's deformity of her foot changing how she is currently walking and contributing to her current pain threshold.  Did review the potential for more bursitis or neuroma as well which may only be seen on MRI.  The patient will follow-up with the foot and ankle specialist when she is down to [place].  She will return sooner if needed.    Follow Up from this visit:  Return if symptoms worsen or fail to improve.  Patient Instructions:  There are no Patient Instructions on file for this visit.    Electronically signed by: Connor Zaidi MD  10/18/2023  11:44 PM    A voice to text program was used to aid in medical record documentation. Sometimes words are printed not exactly as they were spoken. While efforts were made to carefully edit and correct any inaccuracies, some errors may be present. Errors should be taken within the context of the discussion.  Please contact our office if you need assistance interpreting this medical record or notice any mistakes.     ATTESTATION:    Draft generated by [employer] and edited by Angelica Novak, Quality  on 10/16/2023.

## 2023-10-18 ENCOUNTER — TELEPHONE (OUTPATIENT)
Dept: FAMILY MEDICINE | Facility: CLINIC | Age: 73
End: 2023-10-18
Payer: MEDICARE

## 2023-10-18 DIAGNOSIS — J45.901 ASTHMA EXACERBATION: ICD-10-CM

## 2023-10-18 RX ORDER — FLUTICASONE PROPIONATE 110 UG/1
1 AEROSOL, METERED RESPIRATORY (INHALATION) 2 TIMES DAILY
Qty: 12 G | OUTPATIENT
Start: 2023-10-18

## 2023-10-18 RX ORDER — FLUTICASONE PROPIONATE 100 UG/1
1 POWDER, METERED RESPIRATORY (INHALATION) 2 TIMES DAILY
Qty: 180 EACH | Refills: 3 | Status: SHIPPED | OUTPATIENT
Start: 2023-10-18 | End: 2024-10-17

## 2023-10-18 NOTE — TELEPHONE ENCOUNTER
Patient called and needs a renewal of fluticazone  mg and she uses [facility] on [address]  She said this was previously prescribed by another provider, but trying to get everything switched over to Dr. Zaidi.  Can that please be renewed?  Thanks

## 2023-10-27 ENCOUNTER — TELEPHONE (OUTPATIENT)
Dept: FAMILY MEDICINE | Facility: CLINIC | Age: 73
End: 2023-10-27
Payer: MEDICARE

## 2023-10-27 DIAGNOSIS — R91.8 PULMONARY INFILTRATE: Primary | ICD-10-CM

## 2023-10-27 DIAGNOSIS — J45.41 MODERATE PERSISTENT ASTHMA WITH EXACERBATION: ICD-10-CM

## 2023-10-27 RX ORDER — FLUTICASONE PROPIONATE 110 UG/1
1 AEROSOL, METERED RESPIRATORY (INHALATION) 2 TIMES DAILY
Qty: 12 G | Refills: 3 | Status: SHIPPED | OUTPATIENT
Start: 2023-10-27 | End: 2024-01-25

## 2023-10-27 NOTE — TELEPHONE ENCOUNTER
Patient called again to talk to nurse, got a prescription sent electronically to [facility] but she said it could take up to an hour and asked if the nurse could call them regarding it so she can get the medication.  I tried to reach both nurses but couldn't and then lost the call.  Can you call [facility] or patient?

## 2023-10-27 NOTE — TELEPHONE ENCOUNTER
Mason from [facility] called regarding Rx for Fluticasone.  She states that there is no generic for this Rx and the patient says this med is too expensive and insurance won't cover the cost.    It was recommended that the Fluticasone HSA without diskus is much cheaper.    Please call pharmacy regarding Rx.    [phone]

## 2023-11-02 ENCOUNTER — HOSPITAL ENCOUNTER (OUTPATIENT)
Facility: HOSPITAL | Age: 73
Discharge: HOME | End: 2023-11-02
Payer: MEDICARE

## 2023-11-02 VITALS
DIASTOLIC BLOOD PRESSURE: 46 MMHG | RESPIRATION RATE: 18 BRPM | SYSTOLIC BLOOD PRESSURE: 101 MMHG | OXYGEN SATURATION: 97 % | HEART RATE: 73 BPM

## 2023-11-02 DIAGNOSIS — M05.79 RHEUMATOID ARTHRITIS INVOLVING MULTIPLE SITES WITH POSITIVE RHEUMATOID FACTOR (CMS/HCC): Primary | ICD-10-CM

## 2023-11-02 PROCEDURE — 96372 THER/PROPH/DIAG INJ SC/IM: CPT

## 2023-11-02 PROCEDURE — 6360000200 HC RX 636 W HCPCS (ALT 250 FOR IP): Performed by: INTERNAL MEDICINE

## 2023-11-02 RX ADMIN — CERTOLIZUMAB PEGOL 400 MG: KIT at 09:32

## 2023-11-20 ENCOUNTER — OFFICE VISIT (OUTPATIENT)
Dept: URBAN - METROPOLITAN AREA CLINIC 24 | Facility: CLINIC | Age: 73
End: 2023-11-20
Payer: COMMERCIAL

## 2023-11-20 DIAGNOSIS — H52.13 MYOPIA, BILATERAL: ICD-10-CM

## 2023-11-20 PROCEDURE — 92134 CPTRZ OPH DX IMG PST SGM RTA: CPT | Performed by: OPTOMETRIST

## 2023-11-20 PROCEDURE — 99204 OFFICE O/P NEW MOD 45 MIN: CPT | Performed by: OPTOMETRIST

## 2023-11-20 ASSESSMENT — VISUAL ACUITY
OD: 20/25
OS: 20/30

## 2023-11-20 ASSESSMENT — INTRAOCULAR PRESSURE
OD: 15
OS: 15

## 2023-11-20 ASSESSMENT — KERATOMETRY
OS: 44.90
OD: 44.42

## 2023-12-11 ENCOUNTER — ADULT PHYSICAL (OUTPATIENT)
Dept: URBAN - METROPOLITAN AREA CLINIC 24 | Facility: CLINIC | Age: 73
End: 2023-12-11

## 2023-12-11 ENCOUNTER — TECH ONLY (OUTPATIENT)
Dept: URBAN - METROPOLITAN AREA CLINIC 24 | Facility: CLINIC | Age: 73
End: 2023-12-11
Payer: MEDICARE

## 2023-12-11 DIAGNOSIS — Z01.818 ENCOUNTER FOR OTHER PREPROCEDURAL EXAMINATION: Primary | ICD-10-CM

## 2023-12-11 PROCEDURE — 99203 OFFICE O/P NEW LOW 30 MIN: CPT | Performed by: PHYSICIAN ASSISTANT

## 2023-12-11 RX ORDER — HYDROXYCHLOROQUINE SULFATE 200 MG/1
200 MG TABLET, FILM COATED ORAL
Qty: 0 | Refills: 0 | Status: ACTIVE
Start: 2023-12-11

## 2023-12-20 ENCOUNTER — OFFICE VISIT (OUTPATIENT)
Dept: URBAN - METROPOLITAN AREA CLINIC 24 | Facility: CLINIC | Age: 73
End: 2023-12-20
Payer: MEDICARE

## 2023-12-20 DIAGNOSIS — H43.813 VITREOUS DEGENERATION, BILATERAL: ICD-10-CM

## 2023-12-20 DIAGNOSIS — M06.9 RHEUMATOID ARTHRITIS, UNSPECIFIED: ICD-10-CM

## 2023-12-20 DIAGNOSIS — H52.203 BILATERAL ASTIGMATISM: ICD-10-CM

## 2023-12-20 DIAGNOSIS — H25.813 COMBINED FORMS OF AGE-RELATED CATARACT, BILATERAL: Primary | ICD-10-CM

## 2023-12-20 DIAGNOSIS — H25.812 COMBINED FORMS OF AGE-RELATED CATARACT, LEFT EYE: ICD-10-CM

## 2023-12-20 PROCEDURE — 92136 OPHTHALMIC BIOMETRY: CPT | Performed by: OPHTHALMOLOGY

## 2023-12-20 PROCEDURE — 99204 OFFICE O/P NEW MOD 45 MIN: CPT | Performed by: OPHTHALMOLOGY

## 2023-12-20 ASSESSMENT — INTRAOCULAR PRESSURE
OD: 16
OS: 16

## 2023-12-26 ENCOUNTER — SURGERY (OUTPATIENT)
Dept: URBAN - METROPOLITAN AREA SURGERY 12 | Facility: SURGERY | Age: 73
End: 2023-12-26
Payer: MEDICARE

## 2023-12-26 PROCEDURE — 66984 XCAPSL CTRC RMVL W/O ECP: CPT | Performed by: OPHTHALMOLOGY

## 2023-12-26 PROCEDURE — PR1CC PR1CC: CUSTOM | Performed by: OPHTHALMOLOGY

## 2023-12-27 ENCOUNTER — POST-OPERATIVE VISIT (OUTPATIENT)
Dept: URBAN - METROPOLITAN AREA CLINIC 30 | Facility: CLINIC | Age: 73
End: 2023-12-27
Payer: MEDICARE

## 2023-12-27 DIAGNOSIS — Z48.810 ENCOUNTER FOR SURGICAL AFTERCARE FOLLOWING SURGERY ON A SENSE ORGAN: Primary | ICD-10-CM

## 2023-12-27 PROCEDURE — 99024 POSTOP FOLLOW-UP VISIT: CPT

## 2023-12-27 ASSESSMENT — INTRAOCULAR PRESSURE: OD: 19

## 2024-01-02 ENCOUNTER — POST-OPERATIVE VISIT (OUTPATIENT)
Dept: URBAN - METROPOLITAN AREA CLINIC 30 | Facility: CLINIC | Age: 74
End: 2024-01-02
Payer: MEDICARE

## 2024-01-02 PROCEDURE — 99024 POSTOP FOLLOW-UP VISIT: CPT

## 2024-01-02 ASSESSMENT — KERATOMETRY
OD: 44.47
OS: 44.83

## 2024-01-02 ASSESSMENT — VISUAL ACUITY
OD: 20/20
OS: 20/40

## 2024-01-02 ASSESSMENT — INTRAOCULAR PRESSURE
OS: 15
OD: 14

## 2024-01-09 ENCOUNTER — SURGERY (OUTPATIENT)
Dept: URBAN - METROPOLITAN AREA SURGERY 12 | Facility: SURGERY | Age: 74
End: 2024-01-09
Payer: MEDICARE

## 2024-01-09 PROCEDURE — 66984 XCAPSL CTRC RMVL W/O ECP: CPT | Performed by: OPHTHALMOLOGY

## 2024-01-09 PROCEDURE — PR1CP PR1CP: CUSTOM | Performed by: OPHTHALMOLOGY

## 2024-01-10 ENCOUNTER — POST-OPERATIVE VISIT (OUTPATIENT)
Dept: URBAN - METROPOLITAN AREA CLINIC 30 | Facility: CLINIC | Age: 74
End: 2024-01-10
Payer: MEDICARE

## 2024-01-10 PROCEDURE — 99024 POSTOP FOLLOW-UP VISIT: CPT

## 2024-01-10 ASSESSMENT — INTRAOCULAR PRESSURE: OS: 18

## 2024-02-12 ENCOUNTER — POST-OPERATIVE VISIT (OUTPATIENT)
Dept: URBAN - METROPOLITAN AREA CLINIC 30 | Facility: CLINIC | Age: 74
End: 2024-02-12
Payer: MEDICARE

## 2024-02-12 DIAGNOSIS — Z96.1 PRESENCE OF INTRAOCULAR LENS: Primary | ICD-10-CM

## 2024-02-12 PROCEDURE — 99024 POSTOP FOLLOW-UP VISIT: CPT

## 2024-02-12 ASSESSMENT — KERATOMETRY
OD: 44.38
OS: 45.13

## 2024-02-12 ASSESSMENT — INTRAOCULAR PRESSURE
OD: 12
OS: 11

## 2024-02-12 ASSESSMENT — VISUAL ACUITY
OD: 20/20
OS: 20/20

## 2024-03-21 ENCOUNTER — OFFICE VISIT (OUTPATIENT)
Dept: URBAN - METROPOLITAN AREA CLINIC 30 | Facility: CLINIC | Age: 74
End: 2024-03-21
Payer: MEDICARE

## 2024-03-21 DIAGNOSIS — H43.813 VITREOUS DEGENERATION, BILATERAL: Primary | ICD-10-CM

## 2024-03-21 PROCEDURE — 99214 OFFICE O/P EST MOD 30 MIN: CPT | Performed by: OPHTHALMOLOGY

## 2024-03-21 RX ORDER — OFLOXACIN 3 MG/ML
0.3 % SOLUTION/ DROPS OPHTHALMIC
Qty: 5 | Refills: 0 | Status: ACTIVE
Start: 2024-03-21

## 2024-03-21 RX ORDER — PREDNISOLONE ACETATE 10 MG/ML
1 % SUSPENSION/ DROPS OPHTHALMIC
Qty: 5 | Refills: 0 | Status: ACTIVE
Start: 2024-03-21

## 2024-03-21 ASSESSMENT — INTRAOCULAR PRESSURE
OS: 14
OD: 12

## 2024-05-23 ENCOUNTER — HOSPITAL ENCOUNTER (OUTPATIENT)
Facility: HOSPITAL | Age: 74
Discharge: HOME | End: 2024-05-23
Payer: MEDICARE

## 2024-05-23 DIAGNOSIS — M05.79 RHEUMATOID ARTHRITIS INVOLVING MULTIPLE SITES WITH POSITIVE RHEUMATOID FACTOR (CMS/HCC): Primary | ICD-10-CM

## 2024-05-23 PROCEDURE — 6360000200 HC RX 636 W HCPCS (ALT 250 FOR IP): Mod: JZ | Performed by: INTERNAL MEDICINE

## 2024-05-23 PROCEDURE — 96372 THER/PROPH/DIAG INJ SC/IM: CPT

## 2024-05-23 RX ADMIN — CERTOLIZUMAB PEGOL 400 MG: KIT at 08:28

## 2024-06-03 ENCOUNTER — HOSPITAL ENCOUNTER (EMERGENCY)
Facility: HOSPITAL | Age: 74
Discharge: HOME | End: 2024-06-03
Attending: EMERGENCY MEDICINE
Payer: MEDICARE

## 2024-06-03 ENCOUNTER — APPOINTMENT (OUTPATIENT)
Dept: RADIOLOGY | Facility: HOSPITAL | Age: 74
End: 2024-06-03
Payer: MEDICARE

## 2024-06-03 VITALS
SYSTOLIC BLOOD PRESSURE: 117 MMHG | TEMPERATURE: 98 F | DIASTOLIC BLOOD PRESSURE: 65 MMHG | HEART RATE: 68 BPM | OXYGEN SATURATION: 95 % | RESPIRATION RATE: 20 BRPM

## 2024-06-03 DIAGNOSIS — J40 BRONCHITIS: ICD-10-CM

## 2024-06-03 DIAGNOSIS — J45.901 ASTHMA EXACERBATION: ICD-10-CM

## 2024-06-03 DIAGNOSIS — J06.9 URI (UPPER RESPIRATORY INFECTION): Primary | ICD-10-CM

## 2024-06-03 PROCEDURE — 99284 EMERGENCY DEPT VISIT MOD MDM: CPT | Performed by: EMERGENCY MEDICINE

## 2024-06-03 PROCEDURE — 99283 EMERGENCY DEPT VISIT LOW MDM: CPT | Mod: 25 | Performed by: EMERGENCY MEDICINE

## 2024-06-03 PROCEDURE — 71046 X-RAY EXAM CHEST 2 VIEWS: CPT

## 2024-06-03 RX ORDER — DOXYCYCLINE HYCLATE 100 MG/1
1 CAPSULE, GELATIN COATED ORAL ONCE
Status: COMPLETED | OUTPATIENT
Start: 2024-06-03 | End: 2024-06-03

## 2024-06-03 RX ORDER — PREDNISONE 10 MG/1
1 TABLET ORAL ONCE
Status: COMPLETED | OUTPATIENT
Start: 2024-06-03 | End: 2024-06-03

## 2024-06-03 RX ADMIN — PREDNISONE 1 PACKAGE: 10 TABLET ORAL at 19:35

## 2024-06-03 RX ADMIN — DOXYCYCLINE HYCLATE 1 PACKAGE: 100 CAPSULE, GELATIN COATED ORAL at 19:35

## 2024-06-04 NOTE — DISCHARGE INSTRUCTIONS
Prednisone 50 mg (5 tablets once daily) for 5 days then stop.  Doxycycline 1 pill 2 times daily for 7 days.  Use albuterol inhaler with spacer 2 puffs every 4 hours as needed to decrease cough and ease any shortness of breath.    Continue all the medications as previously directed.    Return to emergency department if having worse trouble breathing, vomiting or any other concern.

## 2024-06-04 NOTE — ED PROVIDER NOTES
HPI:  Chief Complaint   Patient presents with    Nasal Congestion     Pt reports believing she has pneumonia.  Pt reports congestion which started on Thursday.     HPI  74-year-old female comes to the emergency department with a 5-day history of cough productive of phlegmy sputum.  She subjectively had fever intermittently and last episode of this mid afternoon.  She denies any runny nose or sinus congestion.  She has a history of rheumatoid arthritis and is on Cimzia.  She is concerned about her immunocompromise state and possibility of pneumonia.  She has a history of asthma.  She uses inhaled steroid twice daily but has not been using any albuterol.    HISTORY:  Past Medical History:   Diagnosis Date    RA (rheumatoid arthritis) (CMS/Formerly [facility])        Past Surgical History:   Procedure Laterality Date    BRONCHOSCOPY N/A 5/20/2021    Procedure: BRONCHOSCOPY with bronchoaveolar lavage;  Surgeon: Andres Rios MD;  Location: [facility];  Service: Endoscopy;  Laterality: N/A;    HYSTERECTOMY         Family History   Problem Relation Age of Onset    Cancer Father     Cancer Sister        Social History     Tobacco Use    Smoking status: Former     Current packs/day: 0.00     Types: Cigarettes     Start date: 5/4/1984     Quit date: 5/4/2009     Years since quitting: 15.0    Smokeless tobacco: Never   Vaping Use    Vaping status: Never Used   Substance Use Topics    Alcohol use: Yes     Comment: wine 1-2 glasses 3-4 nights a week    Drug use: Never         PHYSICAL EXAM:  ED Triage Vitals [06/03/24 1840]   Temp Heart Rate Resp BP SpO2   36.7 °C (98 °F) 74 20 128/62 94 %      Mean BP (mmHg) Temp Source Heart Rate Source Patient Position BP Location   88 Oral -- Sitting Right arm      FiO2 (%)       --         Nursing note and vitals reviewed.  Constitutional: appears well-developed.   Head: Normocephalic and atraumatic.   Eyes: Conjunctiva normal.  Neck: Supple  Cardiovascular: Regular rate and  rhythm  Pulmonary/Chest: No respiratory distress.  Scattered rhonchi to auscultation bilaterally.  Abdominal: Soft and nontender.  Normal bowel sounds.  Back: Non-tender.  Musculoskeletal: No edema  Neurological: Alert. No focal deficits.  Skin: Warm and dry. No rash noted.   Psychiatric: Normal mood and affect.    MDM: Chest x-ray is obtained and does not show any focal infiltrate.  She has had illness for 5 days and I do not feel viral testing will change her management.  She has had no other symptoms thus do not feel regular lab evaluation will be of any benefit either.  Past medical records are reviewed and I see patient has seen pulmonary and has abnormal pulmonary function test and has smoking history.  She reports history of asthma but may have element of COPD.  Chest x-ray does show hyperinflated lungs.  Will treat patient as COPD exacerbation with bronchitis with steroids, doxycycline and recommend she use her albuterol at home.  She is given a spacer to use with her albuterol.  Follow-up with primary care as advised.  She is discharged in stable condition.      RADIOLOGY:  I, David Persaud MD, personally reviewed and evaluated the images as part of my medical decision making as well as reviewing the written report by the radiologist.    ED MD interpretation: No focal infiltrate    Official Radiology report(s):  X-ray chest 2 views   Final Result   IMPRESSION:   No acute cardiopulmonary abnormalities.               CLINICAL IMPRESSION:  Final diagnoses:   [J06.9] URI (upper respiratory infection)   [J40] Bronchitis   [J45.901] Asthma exacerbation   Rheumatoid arthritis           A voice recognition program was used to aid in documentation of this record.  Sometimes words are not printed exactly as they were spoken.  While efforts were made to carefully edit and correct any inaccuracies, some areas may be present; please take these into context.  Please contact the provider if areas are identified.          David Persaud MD  06/03/24 1933

## 2024-06-07 ENCOUNTER — TELEPHONE (OUTPATIENT)
Dept: FAMILY MEDICINE | Facility: CLINIC | Age: 74
End: 2024-06-07
Payer: MEDICARE

## 2024-06-07 DIAGNOSIS — J45.901 EXACERBATION OF ASTHMA, UNSPECIFIED ASTHMA SEVERITY, UNSPECIFIED WHETHER PERSISTENT: ICD-10-CM

## 2024-06-07 RX ORDER — ALBUTEROL SULFATE 90 UG/1
2 INHALANT RESPIRATORY (INHALATION) EVERY 6 HOURS PRN
Qty: 18 G | Refills: 0 | Status: SHIPPED | OUTPATIENT
Start: 2024-06-07 | End: 2024-06-07

## 2024-06-07 RX ORDER — ALBUTEROL SULFATE 90 UG/1
2 INHALANT RESPIRATORY (INHALATION) EVERY 6 HOURS PRN
Qty: 18 G | Refills: 0 | Status: SHIPPED | OUTPATIENT
Start: 2024-06-07

## 2024-06-07 NOTE — TELEPHONE ENCOUNTER
Refill for albuterol sent to [facility].   If patient proceeds to have SOB she needs to be seen in ER.   Thank you.

## 2024-06-07 NOTE — TELEPHONE ENCOUNTER
Called and let them know that the albuterol was sent to [facility] and that if she continues to have shortness of breath she needs to be seen in the ER.  I also explained that we only did the prescription to [facility] and before she runs out she needs to request a refill from Dr. Zaidi.

## 2024-06-07 NOTE — TELEPHONE ENCOUNTER
This is a Dr. Zaidi patient but she needs an inhaler right away so sending to Dr. Titus.      Patient is needing a renewal of her albuterol inhaler sent to [facility] for right now just so she can  today, as she was in the ER and thought she had one but cannot find it.  The last one was actually from another doctor in [place].  Dr. Persaud had recommended that when she was in the ER but did not send one.  Can you please send an updated script to [facility] on [address] and the quick one to [facility] so she has it?      I told her that Dr. Zaidi is out but asking another provider to send that for her.

## 2024-06-21 ENCOUNTER — HOSPITAL ENCOUNTER (OUTPATIENT)
Facility: HOSPITAL | Age: 74
Discharge: HOME | End: 2024-06-21
Payer: MEDICARE

## 2024-06-21 DIAGNOSIS — M05.79 RHEUMATOID ARTHRITIS INVOLVING MULTIPLE SITES WITH POSITIVE RHEUMATOID FACTOR (CMS/HCC): Primary | ICD-10-CM

## 2024-06-21 PROCEDURE — 96372 THER/PROPH/DIAG INJ SC/IM: CPT

## 2024-06-21 PROCEDURE — 6360000200 HC RX 636 W HCPCS (ALT 250 FOR IP): Mod: JZ | Performed by: INTERNAL MEDICINE

## 2024-06-21 RX ADMIN — CERTOLIZUMAB PEGOL 400 MG: KIT at 09:00

## 2024-07-22 ENCOUNTER — HOSPITAL ENCOUNTER (OUTPATIENT)
Facility: HOSPITAL | Age: 74
Discharge: HOME | End: 2024-07-22
Payer: MEDICARE

## 2024-07-22 DIAGNOSIS — M05.79 RHEUMATOID ARTHRITIS INVOLVING MULTIPLE SITES WITH POSITIVE RHEUMATOID FACTOR (CMS/HCC): Primary | ICD-10-CM

## 2024-07-22 PROCEDURE — 96372 THER/PROPH/DIAG INJ SC/IM: CPT

## 2024-07-22 PROCEDURE — 6360000200 HC RX 636 W HCPCS (ALT 250 FOR IP): Mod: JZ

## 2024-07-22 RX ADMIN — CERTOLIZUMAB PEGOL 400 MG: KIT at 09:39

## 2024-08-16 RX ORDER — ALBUTEROL SULFATE 0.83 MG/ML
2.5 SOLUTION RESPIRATORY (INHALATION) AS NEEDED
Status: CANCELLED | OUTPATIENT
Start: 2024-10-21

## 2024-08-16 RX ORDER — ACETAMINOPHEN 500 MG
500 TABLET ORAL EVERY 4 HOURS PRN
Status: CANCELLED | OUTPATIENT
Start: 2024-12-26

## 2024-08-16 RX ORDER — SODIUM CHLORIDE 9 MG/ML
0-999 INJECTION, SOLUTION INTRAVENOUS CONTINUOUS PRN
Status: CANCELLED | OUTPATIENT
Start: 2024-09-23 | End: 2024-09-24

## 2024-08-16 RX ORDER — FAMOTIDINE 10 MG/ML
20 INJECTION INTRAVENOUS AS NEEDED
Status: CANCELLED | OUTPATIENT
Start: 2024-12-26

## 2024-08-16 RX ORDER — FAMOTIDINE 10 MG/ML
20 INJECTION INTRAVENOUS AS NEEDED
Status: CANCELLED | OUTPATIENT
Start: 2024-08-19

## 2024-08-16 RX ORDER — FAMOTIDINE 10 MG/ML
20 INJECTION INTRAVENOUS AS NEEDED
Status: CANCELLED | OUTPATIENT
Start: 2025-03-31

## 2024-08-16 RX ORDER — FAMOTIDINE 10 MG/ML
20 INJECTION INTRAVENOUS AS NEEDED
Status: CANCELLED | OUTPATIENT
Start: 2024-10-28

## 2024-08-16 RX ORDER — FAMOTIDINE 10 MG/ML
20 INJECTION INTRAVENOUS AS NEEDED
Status: CANCELLED | OUTPATIENT
Start: 2025-02-28

## 2024-08-16 RX ORDER — ALBUTEROL SULFATE 0.83 MG/ML
2.5 SOLUTION RESPIRATORY (INHALATION) AS NEEDED
Status: CANCELLED | OUTPATIENT
Start: 2024-12-26

## 2024-08-16 RX ORDER — ACETAMINOPHEN 500 MG
500 TABLET ORAL EVERY 4 HOURS PRN
OUTPATIENT
Start: 2025-07-01

## 2024-08-16 RX ORDER — FAMOTIDINE 10 MG/ML
20 INJECTION INTRAVENOUS AS NEEDED
Status: CANCELLED | OUTPATIENT
Start: 2024-09-23

## 2024-08-16 RX ORDER — DIPHENHYDRAMINE HYDROCHLORIDE 50 MG/ML
25-50 INJECTION INTRAMUSCULAR; INTRAVENOUS AS NEEDED
OUTPATIENT
Start: 2025-04-28

## 2024-08-16 RX ORDER — SODIUM CHLORIDE 9 MG/ML
0-999 INJECTION, SOLUTION INTRAVENOUS CONTINUOUS PRN
Status: CANCELLED | OUTPATIENT
Start: 2024-08-19 | End: 2024-08-20

## 2024-08-16 RX ORDER — ALBUTEROL SULFATE 0.83 MG/ML
2.5 SOLUTION RESPIRATORY (INHALATION) AS NEEDED
OUTPATIENT
Start: 2025-04-28

## 2024-08-16 RX ORDER — EPINEPHRINE 1 MG/ML
0.3 INJECTION INTRAMUSCULAR; INTRAVENOUS; SUBCUTANEOUS AS NEEDED
Status: CANCELLED | OUTPATIENT
Start: 2024-09-23

## 2024-08-16 RX ORDER — DIPHENHYDRAMINE HYDROCHLORIDE 50 MG/ML
25-50 INJECTION INTRAMUSCULAR; INTRAVENOUS AS NEEDED
Status: CANCELLED | OUTPATIENT
Start: 2025-01-30

## 2024-08-16 RX ORDER — EPINEPHRINE 1 MG/ML
0.3 INJECTION INTRAMUSCULAR; INTRAVENOUS; SUBCUTANEOUS AS NEEDED
OUTPATIENT
Start: 2025-08-01

## 2024-08-16 RX ORDER — ACETAMINOPHEN 500 MG
500 TABLET ORAL EVERY 4 HOURS PRN
Status: CANCELLED | OUTPATIENT
Start: 2025-02-28

## 2024-08-16 RX ORDER — SODIUM CHLORIDE 9 MG/ML
0-999 INJECTION, SOLUTION INTRAVENOUS CONTINUOUS PRN
OUTPATIENT
Start: 2025-04-28 | End: 2025-04-23

## 2024-08-16 RX ORDER — EPINEPHRINE 1 MG/ML
0.3 INJECTION INTRAMUSCULAR; INTRAVENOUS; SUBCUTANEOUS AS NEEDED
Status: CANCELLED | OUTPATIENT
Start: 2024-08-19

## 2024-08-16 RX ORDER — ACETAMINOPHEN 500 MG
500 TABLET ORAL EVERY 4 HOURS PRN
Status: CANCELLED | OUTPATIENT
Start: 2024-09-23

## 2024-08-16 RX ORDER — ACETAMINOPHEN 500 MG
500 TABLET ORAL EVERY 4 HOURS PRN
OUTPATIENT
Start: 2025-06-02

## 2024-08-16 RX ORDER — EPINEPHRINE 1 MG/ML
0.3 INJECTION INTRAMUSCULAR; INTRAVENOUS; SUBCUTANEOUS AS NEEDED
Status: CANCELLED | OUTPATIENT
Start: 2025-03-31

## 2024-08-16 RX ORDER — ACETAMINOPHEN 500 MG
500 TABLET ORAL EVERY 4 HOURS PRN
Status: CANCELLED | OUTPATIENT
Start: 2025-03-31

## 2024-08-16 RX ORDER — EPINEPHRINE 1 MG/ML
0.3 INJECTION INTRAMUSCULAR; INTRAVENOUS; SUBCUTANEOUS AS NEEDED
Status: CANCELLED | OUTPATIENT
Start: 2025-02-28

## 2024-08-16 RX ORDER — FAMOTIDINE 10 MG/ML
20 INJECTION INTRAVENOUS AS NEEDED
OUTPATIENT
Start: 2025-04-28

## 2024-08-16 RX ORDER — FAMOTIDINE 10 MG/ML
20 INJECTION INTRAVENOUS AS NEEDED
Status: CANCELLED | OUTPATIENT
Start: 2025-01-30

## 2024-08-16 RX ORDER — EPINEPHRINE 1 MG/ML
0.3 INJECTION INTRAMUSCULAR; INTRAVENOUS; SUBCUTANEOUS AS NEEDED
OUTPATIENT
Start: 2025-04-28

## 2024-08-16 RX ORDER — FAMOTIDINE 10 MG/ML
20 INJECTION INTRAVENOUS AS NEEDED
Status: CANCELLED | OUTPATIENT
Start: 2025-06-24

## 2024-08-16 RX ORDER — DIPHENHYDRAMINE HYDROCHLORIDE 50 MG/ML
25-50 INJECTION INTRAMUSCULAR; INTRAVENOUS AS NEEDED
Status: CANCELLED | OUTPATIENT
Start: 2024-10-21

## 2024-08-16 RX ORDER — FAMOTIDINE 10 MG/ML
20 INJECTION INTRAVENOUS AS NEEDED
OUTPATIENT
Start: 2025-06-02

## 2024-08-16 RX ORDER — DIPHENHYDRAMINE HYDROCHLORIDE 50 MG/ML
25-50 INJECTION INTRAMUSCULAR; INTRAVENOUS AS NEEDED
Status: CANCELLED | OUTPATIENT
Start: 2024-10-28

## 2024-08-16 RX ORDER — EPINEPHRINE 1 MG/ML
0.3 INJECTION INTRAMUSCULAR; INTRAVENOUS; SUBCUTANEOUS AS NEEDED
Status: CANCELLED | OUTPATIENT
Start: 2025-06-24

## 2024-08-16 RX ORDER — ACETAMINOPHEN 500 MG
500 TABLET ORAL EVERY 4 HOURS PRN
Status: CANCELLED | OUTPATIENT
Start: 2024-11-28

## 2024-08-16 RX ORDER — EPINEPHRINE 1 MG/ML
0.3 INJECTION INTRAMUSCULAR; INTRAVENOUS; SUBCUTANEOUS AS NEEDED
Status: CANCELLED | OUTPATIENT
Start: 2025-02-21

## 2024-08-16 RX ORDER — ALBUTEROL SULFATE 0.83 MG/ML
2.5 SOLUTION RESPIRATORY (INHALATION) AS NEEDED
Status: CANCELLED | OUTPATIENT
Start: 2024-11-28

## 2024-08-16 RX ORDER — ALBUTEROL SULFATE 0.83 MG/ML
2.5 SOLUTION RESPIRATORY (INHALATION) AS NEEDED
Status: CANCELLED | OUTPATIENT
Start: 2025-02-28

## 2024-08-16 RX ORDER — SODIUM CHLORIDE 9 MG/ML
0-999 INJECTION, SOLUTION INTRAVENOUS CONTINUOUS PRN
Status: CANCELLED | OUTPATIENT
Start: 2024-11-28 | End: 2024-11-23

## 2024-08-16 RX ORDER — DIPHENHYDRAMINE HYDROCHLORIDE 50 MG/ML
25-50 INJECTION INTRAMUSCULAR; INTRAVENOUS AS NEEDED
Status: CANCELLED | OUTPATIENT
Start: 2025-02-21

## 2024-08-16 RX ORDER — SODIUM CHLORIDE 9 MG/ML
0-999 INJECTION, SOLUTION INTRAVENOUS CONTINUOUS PRN
Status: CANCELLED | OUTPATIENT
Start: 2025-01-30 | End: 2025-01-25

## 2024-08-16 RX ORDER — DIPHENHYDRAMINE HYDROCHLORIDE 50 MG/ML
25-50 INJECTION INTRAMUSCULAR; INTRAVENOUS AS NEEDED
Status: CANCELLED | OUTPATIENT
Start: 2024-11-28

## 2024-08-16 RX ORDER — ALBUTEROL SULFATE 0.83 MG/ML
2.5 SOLUTION RESPIRATORY (INHALATION) AS NEEDED
Status: CANCELLED | OUTPATIENT
Start: 2025-02-21

## 2024-08-16 RX ORDER — DIPHENHYDRAMINE HYDROCHLORIDE 50 MG/ML
25-50 INJECTION INTRAMUSCULAR; INTRAVENOUS AS NEEDED
OUTPATIENT
Start: 2025-08-01

## 2024-08-16 RX ORDER — SODIUM CHLORIDE 9 MG/ML
0-999 INJECTION, SOLUTION INTRAVENOUS CONTINUOUS PRN
Status: CANCELLED | OUTPATIENT
Start: 2024-10-28 | End: 2024-10-23

## 2024-08-16 RX ORDER — SODIUM CHLORIDE 9 MG/ML
0-999 INJECTION, SOLUTION INTRAVENOUS CONTINUOUS PRN
Status: CANCELLED | OUTPATIENT
Start: 2024-10-21 | End: 2024-10-22

## 2024-08-16 RX ORDER — SODIUM CHLORIDE 9 MG/ML
0-999 INJECTION, SOLUTION INTRAVENOUS CONTINUOUS PRN
Status: CANCELLED | OUTPATIENT
Start: 2025-02-28 | End: 2025-02-23

## 2024-08-16 RX ORDER — ALBUTEROL SULFATE 0.83 MG/ML
2.5 SOLUTION RESPIRATORY (INHALATION) AS NEEDED
OUTPATIENT
Start: 2025-06-02

## 2024-08-16 RX ORDER — SODIUM CHLORIDE 9 MG/ML
0-999 INJECTION, SOLUTION INTRAVENOUS CONTINUOUS PRN
Status: CANCELLED | OUTPATIENT
Start: 2025-06-24 | End: 2025-06-25

## 2024-08-16 RX ORDER — ACETAMINOPHEN 500 MG
500 TABLET ORAL EVERY 4 HOURS PRN
Status: CANCELLED | OUTPATIENT
Start: 2025-06-24

## 2024-08-16 RX ORDER — EPINEPHRINE 1 MG/ML
0.3 INJECTION INTRAMUSCULAR; INTRAVENOUS; SUBCUTANEOUS AS NEEDED
Status: CANCELLED | OUTPATIENT
Start: 2025-01-30

## 2024-08-16 RX ORDER — SODIUM CHLORIDE 9 MG/ML
0-999 INJECTION, SOLUTION INTRAVENOUS CONTINUOUS PRN
Status: CANCELLED | OUTPATIENT
Start: 2024-12-26 | End: 2024-12-21

## 2024-08-16 RX ORDER — DIPHENHYDRAMINE HYDROCHLORIDE 50 MG/ML
25-50 INJECTION INTRAMUSCULAR; INTRAVENOUS AS NEEDED
Status: CANCELLED | OUTPATIENT
Start: 2024-08-19

## 2024-08-16 RX ORDER — DIPHENHYDRAMINE HYDROCHLORIDE 50 MG/ML
25-50 INJECTION INTRAMUSCULAR; INTRAVENOUS AS NEEDED
Status: CANCELLED | OUTPATIENT
Start: 2025-06-24

## 2024-08-16 RX ORDER — FAMOTIDINE 10 MG/ML
20 INJECTION INTRAVENOUS AS NEEDED
OUTPATIENT
Start: 2025-07-01

## 2024-08-16 RX ORDER — ALBUTEROL SULFATE 0.83 MG/ML
2.5 SOLUTION RESPIRATORY (INHALATION) AS NEEDED
Status: CANCELLED | OUTPATIENT
Start: 2024-08-19

## 2024-08-16 RX ORDER — DIPHENHYDRAMINE HYDROCHLORIDE 50 MG/ML
25-50 INJECTION INTRAMUSCULAR; INTRAVENOUS AS NEEDED
Status: CANCELLED | OUTPATIENT
Start: 2025-03-31

## 2024-08-16 RX ORDER — ALBUTEROL SULFATE 0.83 MG/ML
2.5 SOLUTION RESPIRATORY (INHALATION) AS NEEDED
Status: CANCELLED | OUTPATIENT
Start: 2024-09-23

## 2024-08-16 RX ORDER — ACETAMINOPHEN 500 MG
500 TABLET ORAL EVERY 4 HOURS PRN
Status: CANCELLED | OUTPATIENT
Start: 2024-10-21

## 2024-08-16 RX ORDER — EPINEPHRINE 1 MG/ML
0.3 INJECTION INTRAMUSCULAR; INTRAVENOUS; SUBCUTANEOUS AS NEEDED
Status: CANCELLED | OUTPATIENT
Start: 2024-10-28

## 2024-08-16 RX ORDER — SODIUM CHLORIDE 9 MG/ML
0-999 INJECTION, SOLUTION INTRAVENOUS CONTINUOUS PRN
Status: CANCELLED | OUTPATIENT
Start: 2025-03-31 | End: 2025-03-26

## 2024-08-16 RX ORDER — ALBUTEROL SULFATE 0.83 MG/ML
2.5 SOLUTION RESPIRATORY (INHALATION) AS NEEDED
Status: CANCELLED | OUTPATIENT
Start: 2024-10-28

## 2024-08-16 RX ORDER — FAMOTIDINE 10 MG/ML
20 INJECTION INTRAVENOUS AS NEEDED
Status: CANCELLED | OUTPATIENT
Start: 2024-10-21

## 2024-08-16 RX ORDER — DIPHENHYDRAMINE HYDROCHLORIDE 50 MG/ML
25-50 INJECTION INTRAMUSCULAR; INTRAVENOUS AS NEEDED
Status: CANCELLED | OUTPATIENT
Start: 2024-12-26

## 2024-08-16 RX ORDER — DIPHENHYDRAMINE HYDROCHLORIDE 50 MG/ML
25-50 INJECTION INTRAMUSCULAR; INTRAVENOUS AS NEEDED
OUTPATIENT
Start: 2025-06-02

## 2024-08-16 RX ORDER — SODIUM CHLORIDE 9 MG/ML
0-999 INJECTION, SOLUTION INTRAVENOUS CONTINUOUS PRN
OUTPATIENT
Start: 2025-06-02 | End: 2025-05-28

## 2024-08-16 RX ORDER — DIPHENHYDRAMINE HYDROCHLORIDE 50 MG/ML
25-50 INJECTION INTRAMUSCULAR; INTRAVENOUS AS NEEDED
Status: CANCELLED | OUTPATIENT
Start: 2024-09-23

## 2024-08-16 RX ORDER — ALBUTEROL SULFATE 0.83 MG/ML
2.5 SOLUTION RESPIRATORY (INHALATION) AS NEEDED
Status: CANCELLED | OUTPATIENT
Start: 2025-01-30

## 2024-08-16 RX ORDER — EPINEPHRINE 1 MG/ML
0.3 INJECTION INTRAMUSCULAR; INTRAVENOUS; SUBCUTANEOUS AS NEEDED
Status: CANCELLED | OUTPATIENT
Start: 2024-10-21

## 2024-08-16 RX ORDER — ACETAMINOPHEN 500 MG
500 TABLET ORAL EVERY 4 HOURS PRN
Status: CANCELLED | OUTPATIENT
Start: 2024-10-28

## 2024-08-16 RX ORDER — SODIUM CHLORIDE 9 MG/ML
0-999 INJECTION, SOLUTION INTRAVENOUS CONTINUOUS PRN
OUTPATIENT
Start: 2025-08-01 | End: 2025-07-27

## 2024-08-16 RX ORDER — EPINEPHRINE 1 MG/ML
0.3 INJECTION INTRAMUSCULAR; INTRAVENOUS; SUBCUTANEOUS AS NEEDED
OUTPATIENT
Start: 2025-07-01

## 2024-08-16 RX ORDER — FAMOTIDINE 10 MG/ML
20 INJECTION INTRAVENOUS AS NEEDED
Status: CANCELLED | OUTPATIENT
Start: 2024-11-28

## 2024-08-16 RX ORDER — ACETAMINOPHEN 500 MG
500 TABLET ORAL EVERY 4 HOURS PRN
OUTPATIENT
Start: 2025-08-01

## 2024-08-16 RX ORDER — EPINEPHRINE 1 MG/ML
0.3 INJECTION INTRAMUSCULAR; INTRAVENOUS; SUBCUTANEOUS AS NEEDED
OUTPATIENT
Start: 2025-06-02

## 2024-08-16 RX ORDER — ACETAMINOPHEN 500 MG
500 TABLET ORAL EVERY 4 HOURS PRN
Status: CANCELLED | OUTPATIENT
Start: 2024-08-19

## 2024-08-16 RX ORDER — ALBUTEROL SULFATE 0.83 MG/ML
2.5 SOLUTION RESPIRATORY (INHALATION) AS NEEDED
OUTPATIENT
Start: 2025-08-01

## 2024-08-16 RX ORDER — DIPHENHYDRAMINE HYDROCHLORIDE 50 MG/ML
25-50 INJECTION INTRAMUSCULAR; INTRAVENOUS AS NEEDED
Status: CANCELLED | OUTPATIENT
Start: 2025-02-28

## 2024-08-16 RX ORDER — ALBUTEROL SULFATE 0.83 MG/ML
2.5 SOLUTION RESPIRATORY (INHALATION) AS NEEDED
Status: CANCELLED | OUTPATIENT
Start: 2025-03-31

## 2024-08-16 RX ORDER — EPINEPHRINE 1 MG/ML
0.3 INJECTION INTRAMUSCULAR; INTRAVENOUS; SUBCUTANEOUS AS NEEDED
Status: CANCELLED | OUTPATIENT
Start: 2024-11-28

## 2024-08-16 RX ORDER — ALBUTEROL SULFATE 0.83 MG/ML
2.5 SOLUTION RESPIRATORY (INHALATION) AS NEEDED
Status: CANCELLED | OUTPATIENT
Start: 2025-06-24

## 2024-08-16 RX ORDER — ACETAMINOPHEN 500 MG
500 TABLET ORAL EVERY 4 HOURS PRN
Status: CANCELLED | OUTPATIENT
Start: 2025-02-21

## 2024-08-16 RX ORDER — ACETAMINOPHEN 500 MG
500 TABLET ORAL EVERY 4 HOURS PRN
OUTPATIENT
Start: 2025-04-28

## 2024-08-16 RX ORDER — EPINEPHRINE 1 MG/ML
0.3 INJECTION INTRAMUSCULAR; INTRAVENOUS; SUBCUTANEOUS AS NEEDED
Status: CANCELLED | OUTPATIENT
Start: 2024-12-26

## 2024-08-16 RX ORDER — ALBUTEROL SULFATE 0.83 MG/ML
2.5 SOLUTION RESPIRATORY (INHALATION) AS NEEDED
OUTPATIENT
Start: 2025-07-01

## 2024-08-16 RX ORDER — DIPHENHYDRAMINE HYDROCHLORIDE 50 MG/ML
25-50 INJECTION INTRAMUSCULAR; INTRAVENOUS AS NEEDED
OUTPATIENT
Start: 2025-07-01

## 2024-08-16 RX ORDER — FAMOTIDINE 10 MG/ML
20 INJECTION INTRAVENOUS AS NEEDED
Status: CANCELLED | OUTPATIENT
Start: 2025-02-21

## 2024-08-16 RX ORDER — ACETAMINOPHEN 500 MG
500 TABLET ORAL EVERY 4 HOURS PRN
Status: CANCELLED | OUTPATIENT
Start: 2025-01-30

## 2024-08-16 RX ORDER — SODIUM CHLORIDE 9 MG/ML
0-999 INJECTION, SOLUTION INTRAVENOUS CONTINUOUS PRN
OUTPATIENT
Start: 2025-07-01 | End: 2025-06-26

## 2024-08-16 RX ORDER — FAMOTIDINE 10 MG/ML
20 INJECTION INTRAVENOUS AS NEEDED
OUTPATIENT
Start: 2025-08-01

## 2024-08-16 RX ORDER — SODIUM CHLORIDE 9 MG/ML
0-999 INJECTION, SOLUTION INTRAVENOUS CONTINUOUS PRN
Status: CANCELLED | OUTPATIENT
Start: 2025-02-21 | End: 2025-02-22

## 2024-08-19 ENCOUNTER — HOSPITAL ENCOUNTER (OUTPATIENT)
Facility: HOSPITAL | Age: 74
Discharge: HOME | End: 2024-08-19
Payer: MEDICARE

## 2024-08-19 VITALS
SYSTOLIC BLOOD PRESSURE: 137 MMHG | HEART RATE: 56 BPM | RESPIRATION RATE: 18 BRPM | DIASTOLIC BLOOD PRESSURE: 57 MMHG | OXYGEN SATURATION: 94 %

## 2024-08-19 DIAGNOSIS — M05.79 RHEUMATOID ARTHRITIS INVOLVING MULTIPLE SITES WITH POSITIVE RHEUMATOID FACTOR (CMS/HCC): Primary | ICD-10-CM

## 2024-08-19 PROCEDURE — 6360000200 HC RX 636 W HCPCS (ALT 250 FOR IP): Mod: JZ | Performed by: INTERNAL MEDICINE

## 2024-08-19 PROCEDURE — 96372 THER/PROPH/DIAG INJ SC/IM: CPT

## 2024-08-19 RX ADMIN — CERTOLIZUMAB PEGOL 400 MG: KIT at 09:34

## 2024-09-23 ENCOUNTER — HOSPITAL ENCOUNTER (OUTPATIENT)
Facility: HOSPITAL | Age: 74
Discharge: HOME | End: 2024-09-23
Payer: MEDICARE

## 2024-09-23 VITALS — HEART RATE: 60 BPM | OXYGEN SATURATION: 97 % | DIASTOLIC BLOOD PRESSURE: 70 MMHG | SYSTOLIC BLOOD PRESSURE: 123 MMHG

## 2024-09-23 DIAGNOSIS — M05.79 RHEUMATOID ARTHRITIS INVOLVING MULTIPLE SITES WITH POSITIVE RHEUMATOID FACTOR (CMS/HCC): Primary | ICD-10-CM

## 2024-09-23 PROCEDURE — 96372 THER/PROPH/DIAG INJ SC/IM: CPT

## 2024-09-23 PROCEDURE — 6360000200 HC RX 636 W HCPCS (ALT 250 FOR IP): Mod: JZ

## 2024-09-23 RX ADMIN — CERTOLIZUMAB PEGOL 400 MG: KIT at 08:57

## 2024-10-28 ENCOUNTER — HOSPITAL ENCOUNTER (OUTPATIENT)
Facility: HOSPITAL | Age: 74
Discharge: HOME | End: 2024-10-28
Payer: MEDICARE

## 2024-10-28 VITALS — HEART RATE: 62 BPM | SYSTOLIC BLOOD PRESSURE: 115 MMHG | DIASTOLIC BLOOD PRESSURE: 60 MMHG | OXYGEN SATURATION: 97 %

## 2024-10-28 DIAGNOSIS — M05.79 RHEUMATOID ARTHRITIS INVOLVING MULTIPLE SITES WITH POSITIVE RHEUMATOID FACTOR (CMS/HCC): Primary | ICD-10-CM

## 2024-10-28 PROCEDURE — 96374 THER/PROPH/DIAG INJ IV PUSH: CPT

## 2024-10-28 PROCEDURE — 96372 THER/PROPH/DIAG INJ SC/IM: CPT

## 2024-10-28 PROCEDURE — 6360000200 HC RX 636 W HCPCS (ALT 250 FOR IP): Mod: JZ

## 2024-10-28 RX ADMIN — CERTOLIZUMAB PEGOL 400 MG: KIT at 11:57

## 2025-02-20 ENCOUNTER — OFFICE VISIT (OUTPATIENT)
Dept: URBAN - METROPOLITAN AREA CLINIC 30 | Facility: CLINIC | Age: 75
End: 2025-02-20
Payer: MEDICARE

## 2025-02-20 DIAGNOSIS — H43.813 VITREOUS DEGENERATION, BILATERAL: Primary | ICD-10-CM

## 2025-02-20 PROCEDURE — 99214 OFFICE O/P EST MOD 30 MIN: CPT | Performed by: OPHTHALMOLOGY

## 2025-02-20 RX ORDER — OFLOXACIN 3 MG/ML
0.3 % SOLUTION/ DROPS OPHTHALMIC
Qty: 5 | Refills: 0 | Status: ACTIVE
Start: 2025-02-20

## 2025-02-20 RX ORDER — PREDNISOLONE ACETATE 10 MG/ML
1 % SUSPENSION/ DROPS OPHTHALMIC
Qty: 5 | Refills: 0 | Status: ACTIVE
Start: 2025-02-20

## 2025-02-20 ASSESSMENT — INTRAOCULAR PRESSURE
OD: 14
OS: 16

## 2025-03-15 ENCOUNTER — TELEPHONE (OUTPATIENT)
Dept: FAMILY MEDICINE | Facility: CLINIC | Age: 75
End: 2025-03-15

## 2025-03-15 DIAGNOSIS — J45.901 ASTHMA EXACERBATION: ICD-10-CM

## 2025-03-17 RX ORDER — FLUTICASONE PROPIONATE 100 UG/1
1 POWDER, METERED RESPIRATORY (INHALATION) 2 TIMES DAILY
Status: CANCELLED | OUTPATIENT
Start: 2025-03-17 | End: 2026-03-17

## 2025-03-17 NOTE — TELEPHONE ENCOUNTER
Care Due:                  Date            Visit Type   Department     Provider  --------------------------------------------------------------------------------  Last Visit: 10-      [facility]      None Found     [facility]  Next Visit: None Scheduled  None         None Found                                                            Last  Test          Frequency    Reason                     Performed    Due Date  --------------------------------------------------------------------------------  Office Visit  12 months..  albuterol, fluticasone...  10-   10-    [facility] Embedded Care Due Messages. Reference number: [id number]. 3/17/2025 9:26:16 AM [facility]

## 2025-03-18 DIAGNOSIS — J45.901 ASTHMA EXACERBATION: ICD-10-CM

## 2025-03-18 NOTE — TELEPHONE ENCOUNTER
Please get patient schedule  and we will bridge her as it has been 2 years since we have seen patient.

## 2025-03-18 NOTE — TELEPHONE ENCOUNTER
Valorie was called by [facility] in [facility] and they let her know that they sent a request to the clinic on March 15 for a refill of fluticasone inhaler     She called to ask if Dr Zaidi had received anything yet from this pharmacy?    [facility]:    Fax:  [phone]    Phone:  [phone]

## 2025-03-18 NOTE — TELEPHONE ENCOUNTER
I called Valorie and I offered next available June 12     She has been scheduled for [facility]     I let her know medication could be bridged until appt and asked which prescriptions she needed     Valorie stated her only prescription she is concerned about as of today is her fluticasone inhaler and asks if this could be refilled?

## 2025-03-19 RX ORDER — FLUTICASONE PROPIONATE 100 UG/1
1 POWDER, METERED RESPIRATORY (INHALATION) 2 TIMES DAILY
Qty: 60 EACH | Refills: 1 | Status: SHIPPED | OUTPATIENT
Start: 2025-03-19

## 2025-03-19 NOTE — TELEPHONE ENCOUNTER
No care due was identified.  [facility] Embedded Care Due Messages. Reference number: [id number]. 3/19/2025 7:06:57 AM [facility]

## 2025-03-20 ENCOUNTER — POST-OPERATIVE VISIT (OUTPATIENT)
Dept: URBAN - METROPOLITAN AREA CLINIC 30 | Facility: CLINIC | Age: 75
End: 2025-03-20
Payer: MEDICARE

## 2025-03-20 DIAGNOSIS — Z48.810 ENCOUNTER FOR SURGICAL AFTERCARE FOLLOWING SURGERY ON A SENSE ORGAN: Primary | ICD-10-CM

## 2025-03-20 PROCEDURE — 99024 POSTOP FOLLOW-UP VISIT: CPT

## 2025-03-20 ASSESSMENT — INTRAOCULAR PRESSURE: OD: 17

## 2025-05-06 ENCOUNTER — HOSPITAL ENCOUNTER (OUTPATIENT)
Facility: HOSPITAL | Age: 75
Discharge: HOME | End: 2025-05-06
Payer: MEDICARE

## 2025-05-06 DIAGNOSIS — M05.79 RHEUMATOID ARTHRITIS INVOLVING MULTIPLE SITES WITH POSITIVE RHEUMATOID FACTOR (CMS/HCC): Primary | ICD-10-CM

## 2025-05-06 PROCEDURE — 96372 THER/PROPH/DIAG INJ SC/IM: CPT

## 2025-05-06 PROCEDURE — 6360000200 HC RX 636 W HCPCS (ALT 250 FOR IP): Mod: JZ | Performed by: INTERNAL MEDICINE

## 2025-05-06 RX ADMIN — CERTOLIZUMAB PEGOL 400 MG: KIT at 09:59

## 2025-06-10 ENCOUNTER — HOSPITAL ENCOUNTER (OUTPATIENT)
Facility: HOSPITAL | Age: 75
Discharge: HOME | End: 2025-06-10
Payer: MEDICARE

## 2025-06-10 VITALS — SYSTOLIC BLOOD PRESSURE: 134 MMHG | HEART RATE: 60 BPM | OXYGEN SATURATION: 96 % | DIASTOLIC BLOOD PRESSURE: 71 MMHG

## 2025-06-10 DIAGNOSIS — M05.79 RHEUMATOID ARTHRITIS INVOLVING MULTIPLE SITES WITH POSITIVE RHEUMATOID FACTOR (CMS/HCC): Primary | ICD-10-CM

## 2025-06-10 PROCEDURE — 6360000200 HC RX 636 W HCPCS (ALT 250 FOR IP): Mod: JZ

## 2025-06-10 PROCEDURE — 96372 THER/PROPH/DIAG INJ SC/IM: CPT

## 2025-06-10 RX ADMIN — CERTOLIZUMAB PEGOL 400 MG: KIT at 08:55

## 2025-06-27 ENCOUNTER — TELEPHONE (OUTPATIENT)
Dept: FAMILY MEDICINE | Facility: CLINIC | Age: 75
End: 2025-06-27

## 2025-06-27 NOTE — TELEPHONE ENCOUNTER
Valorie was to be moved from June 27 to next available date- next to offer is September 12,    She explains she wants to be seen for [facility] and she has acute concerns.     She wants to be scheduled next week- week of June 30-July 1     Okay to schedule patient this week?

## 2025-07-09 ENCOUNTER — HOSPITAL ENCOUNTER (OUTPATIENT)
Facility: HOSPITAL | Age: 75
Discharge: HOME | End: 2025-07-09
Payer: MEDICARE

## 2025-07-09 VITALS
OXYGEN SATURATION: 94 % | DIASTOLIC BLOOD PRESSURE: 57 MMHG | SYSTOLIC BLOOD PRESSURE: 123 MMHG | HEART RATE: 55 BPM | RESPIRATION RATE: 18 BRPM

## 2025-07-09 DIAGNOSIS — M05.79 RHEUMATOID ARTHRITIS INVOLVING MULTIPLE SITES WITH POSITIVE RHEUMATOID FACTOR (CMS/HCC): Primary | ICD-10-CM

## 2025-07-09 PROCEDURE — 6360000200 HC RX 636 W HCPCS (ALT 250 FOR IP): Mod: JZ | Performed by: INTERNAL MEDICINE

## 2025-07-09 PROCEDURE — 96372 THER/PROPH/DIAG INJ SC/IM: CPT

## 2025-07-09 RX ADMIN — CERTOLIZUMAB PEGOL 400 MG: KIT at 09:14

## 2025-08-06 ENCOUNTER — HOSPITAL ENCOUNTER (OUTPATIENT)
Facility: HOSPITAL | Age: 75
Discharge: HOME | End: 2025-08-06
Payer: MEDICARE

## 2025-08-06 DIAGNOSIS — M05.79 RHEUMATOID ARTHRITIS INVOLVING MULTIPLE SITES WITH POSITIVE RHEUMATOID FACTOR (CMS/HCC): Primary | ICD-10-CM

## 2025-08-06 PROCEDURE — 6360000200 HC RX 636 W HCPCS (ALT 250 FOR IP): Mod: JZ | Performed by: INTERNAL MEDICINE

## 2025-08-06 PROCEDURE — 96372 THER/PROPH/DIAG INJ SC/IM: CPT

## 2025-08-06 RX ADMIN — CERTOLIZUMAB PEGOL 400 MG: KIT at 08:44

## 2025-09-05 ENCOUNTER — HOSPITAL ENCOUNTER (OUTPATIENT)
Facility: HOSPITAL | Age: 75
Discharge: HOME | End: 2025-09-05
Payer: MEDICARE

## 2025-09-05 VITALS
OXYGEN SATURATION: 98 % | SYSTOLIC BLOOD PRESSURE: 130 MMHG | TEMPERATURE: 97.6 F | DIASTOLIC BLOOD PRESSURE: 71 MMHG | HEART RATE: 63 BPM

## 2025-09-05 DIAGNOSIS — M05.79 RHEUMATOID ARTHRITIS INVOLVING MULTIPLE SITES WITH POSITIVE RHEUMATOID FACTOR (CMS/HCC): Primary | ICD-10-CM

## 2025-09-05 PROCEDURE — 6360000200 HC RX 636 W HCPCS (ALT 250 FOR IP): Mod: JZ

## 2025-09-05 PROCEDURE — 96372 THER/PROPH/DIAG INJ SC/IM: CPT

## 2025-09-05 RX ADMIN — CERTOLIZUMAB PEGOL 400 MG: KIT at 09:28

## (undated) DEVICE — VALVE SUCTION MH-83 52897

## (undated) DEVICE — SOL SOD CHL IRR .9% 250ML BTL USP PLASTIC POUR BOTTLE

## (undated) DEVICE — TRAP SPUTUM SPECIMEN 80CC @

## (undated) DEVICE — BOWL 16 OZ GRADUATED STL

## (undated) DEVICE — ADAPTER DUAL AXIS SWIVEL FIBER OPTIC BRONCHO

## (undated) DEVICE — BLOCK BITE 60F OMNIBLOC YLW YELLOW

## (undated) DEVICE — VALVE BIOPSY MAJ-210

## (undated) DEVICE — SYRINGE 20ML LUER SLIP TIP NO NEEDLE

## (undated) DEVICE — BRUSH CYTOLOGY 1.8MM X 110CM SPECIALTY TIP